# Patient Record
Sex: FEMALE | Employment: OTHER | URBAN - METROPOLITAN AREA
[De-identification: names, ages, dates, MRNs, and addresses within clinical notes are randomized per-mention and may not be internally consistent; named-entity substitution may affect disease eponyms.]

---

## 2017-08-02 ENCOUNTER — APPOINTMENT (OUTPATIENT)
Dept: CT IMAGING | Age: 82
DRG: 689 | End: 2017-08-02
Attending: EMERGENCY MEDICINE
Payer: MEDICARE

## 2017-08-02 ENCOUNTER — APPOINTMENT (OUTPATIENT)
Dept: MRI IMAGING | Age: 82
DRG: 689 | End: 2017-08-02
Attending: PSYCHIATRY & NEUROLOGY
Payer: MEDICARE

## 2017-08-02 ENCOUNTER — APPOINTMENT (OUTPATIENT)
Dept: GENERAL RADIOLOGY | Age: 82
DRG: 689 | End: 2017-08-02
Attending: EMERGENCY MEDICINE
Payer: MEDICARE

## 2017-08-02 ENCOUNTER — HOSPITAL ENCOUNTER (INPATIENT)
Age: 82
LOS: 3 days | Discharge: NURSING, REHAB OR LONG-TERM CARE | DRG: 689 | End: 2017-08-05
Attending: EMERGENCY MEDICINE | Admitting: STUDENT IN AN ORGANIZED HEALTH CARE EDUCATION/TRAINING PROGRAM
Payer: MEDICARE

## 2017-08-02 DIAGNOSIS — N39.0 SEPSIS SECONDARY TO UTI (HCC): Primary | ICD-10-CM

## 2017-08-02 DIAGNOSIS — R56.9 SEIZURE (HCC): ICD-10-CM

## 2017-08-02 DIAGNOSIS — A41.9 SEPSIS SECONDARY TO UTI (HCC): Primary | ICD-10-CM

## 2017-08-02 DIAGNOSIS — G30.9 ALZHEIMER'S DEMENTIA WITH BEHAVIORAL DISTURBANCE, UNSPECIFIED TIMING OF DEMENTIA ONSET: ICD-10-CM

## 2017-08-02 DIAGNOSIS — F02.81 ALZHEIMER'S DEMENTIA WITH BEHAVIORAL DISTURBANCE, UNSPECIFIED TIMING OF DEMENTIA ONSET: ICD-10-CM

## 2017-08-02 PROBLEM — I10 HTN (HYPERTENSION): Chronic | Status: ACTIVE | Noted: 2017-08-02

## 2017-08-02 PROBLEM — F03.90 DEMENTIA (HCC): Chronic | Status: ACTIVE | Noted: 2017-08-02

## 2017-08-02 PROBLEM — E03.9 HYPOTHYROIDISM: Chronic | Status: ACTIVE | Noted: 2017-08-02

## 2017-08-02 PROBLEM — I73.9 PVD (PERIPHERAL VASCULAR DISEASE) (HCC): Chronic | Status: ACTIVE | Noted: 2017-08-02

## 2017-08-02 LAB
ALBUMIN SERPL BCP-MCNC: 3.2 G/DL (ref 3.5–5)
ALBUMIN/GLOB SERPL: 0.8 {RATIO} (ref 1.1–2.2)
ALP SERPL-CCNC: 56 U/L (ref 45–117)
ALT SERPL-CCNC: 14 U/L (ref 12–78)
ANION GAP BLD CALC-SCNC: 6 MMOL/L (ref 5–15)
APPEARANCE UR: ABNORMAL
AST SERPL W P-5'-P-CCNC: 48 U/L (ref 15–37)
ATRIAL RATE: 95 BPM
BACTERIA URNS QL MICRO: ABNORMAL /HPF
BASOPHILS # BLD AUTO: 0 K/UL (ref 0–0.1)
BASOPHILS # BLD: 0 % (ref 0–1)
BILIRUB SERPL-MCNC: 0.5 MG/DL (ref 0.2–1)
BILIRUB UR QL: NEGATIVE
BUN SERPL-MCNC: 21 MG/DL (ref 6–20)
BUN/CREAT SERPL: 17 (ref 12–20)
CALCIUM SERPL-MCNC: 9 MG/DL (ref 8.5–10.1)
CALCULATED P AXIS, ECG09: 70 DEGREES
CALCULATED R AXIS, ECG10: -32 DEGREES
CALCULATED T AXIS, ECG11: 26 DEGREES
CHLORIDE SERPL-SCNC: 101 MMOL/L (ref 97–108)
CO2 SERPL-SCNC: 31 MMOL/L (ref 21–32)
COLOR UR: ABNORMAL
CREAT SERPL-MCNC: 1.21 MG/DL (ref 0.55–1.02)
DIAGNOSIS, 93000: NORMAL
DIFFERENTIAL METHOD BLD: ABNORMAL
EOSINOPHIL # BLD: 0.2 K/UL (ref 0–0.4)
EOSINOPHIL NFR BLD: 2 % (ref 0–7)
EPITH CASTS URNS QL MICRO: ABNORMAL /LPF
ERYTHROCYTE [DISTWIDTH] IN BLOOD BY AUTOMATED COUNT: 15.6 % (ref 11.5–14.5)
GLOBULIN SER CALC-MCNC: 3.9 G/DL (ref 2–4)
GLUCOSE BLD STRIP.AUTO-MCNC: 101 MG/DL (ref 65–100)
GLUCOSE BLD STRIP.AUTO-MCNC: 110 MG/DL (ref 65–100)
GLUCOSE BLD STRIP.AUTO-MCNC: 91 MG/DL (ref 65–100)
GLUCOSE BLD STRIP.AUTO-MCNC: 99 MG/DL (ref 65–100)
GLUCOSE SERPL-MCNC: 111 MG/DL (ref 65–100)
GLUCOSE UR STRIP.AUTO-MCNC: NEGATIVE MG/DL
GRAN CASTS URNS QL MICRO: ABNORMAL /LPF
HCT VFR BLD AUTO: 36.5 % (ref 35–47)
HGB BLD-MCNC: 11.8 G/DL (ref 11.5–16)
HGB UR QL STRIP: ABNORMAL
KETONES UR QL STRIP.AUTO: NEGATIVE MG/DL
LACTATE SERPL-SCNC: 1 MMOL/L (ref 0.4–2)
LACTATE SERPL-SCNC: 5.5 MMOL/L (ref 0.4–2)
LEUKOCYTE ESTERASE UR QL STRIP.AUTO: ABNORMAL
LYMPHOCYTES # BLD AUTO: 35 % (ref 12–49)
LYMPHOCYTES # BLD: 3.4 K/UL (ref 0.8–3.5)
MCH RBC QN AUTO: 29.8 PG (ref 26–34)
MCHC RBC AUTO-ENTMCNC: 32.3 G/DL (ref 30–36.5)
MCV RBC AUTO: 92.2 FL (ref 80–99)
MONOCYTES # BLD: 0.8 K/UL (ref 0–1)
MONOCYTES NFR BLD AUTO: 8 % (ref 5–13)
NEUTS SEG # BLD: 5.4 K/UL (ref 1.8–8)
NEUTS SEG NFR BLD AUTO: 55 % (ref 32–75)
NITRITE UR QL STRIP.AUTO: NEGATIVE
P-R INTERVAL, ECG05: 194 MS
PH UR STRIP: 5.5 [PH] (ref 5–8)
PLATELET # BLD AUTO: 228 K/UL (ref 150–400)
POTASSIUM SERPL-SCNC: 5.2 MMOL/L (ref 3.5–5.1)
PROT SERPL-MCNC: 7.1 G/DL (ref 6.4–8.2)
PROT UR STRIP-MCNC: 30 MG/DL
Q-T INTERVAL, ECG07: 390 MS
QRS DURATION, ECG06: 84 MS
QTC CALCULATION (BEZET), ECG08: 490 MS
RBC # BLD AUTO: 3.96 M/UL (ref 3.8–5.2)
RBC #/AREA URNS HPF: ABNORMAL /HPF (ref 0–5)
RBC MORPH BLD: ABNORMAL
RBC MORPH BLD: ABNORMAL
SERVICE CMNT-IMP: ABNORMAL
SERVICE CMNT-IMP: ABNORMAL
SERVICE CMNT-IMP: NORMAL
SERVICE CMNT-IMP: NORMAL
SODIUM SERPL-SCNC: 138 MMOL/L (ref 136–145)
SP GR UR REFRACTOMETRY: 1.02 (ref 1–1.03)
TROPONIN I SERPL-MCNC: <0.04 NG/ML
UROBILINOGEN UR QL STRIP.AUTO: 0.2 EU/DL (ref 0.2–1)
VENTRICULAR RATE, ECG03: 95 BPM
WBC # BLD AUTO: 9.8 K/UL (ref 3.6–11)
WBC CASTS URNS QL MICRO: ABNORMAL /LPF
WBC URNS QL MICRO: >100 /HPF (ref 0–4)

## 2017-08-02 PROCEDURE — 84484 ASSAY OF TROPONIN QUANT: CPT | Performed by: EMERGENCY MEDICINE

## 2017-08-02 PROCEDURE — 71010 XR CHEST PORT: CPT

## 2017-08-02 PROCEDURE — 87086 URINE CULTURE/COLONY COUNT: CPT | Performed by: EMERGENCY MEDICINE

## 2017-08-02 PROCEDURE — 80053 COMPREHEN METABOLIC PANEL: CPT | Performed by: EMERGENCY MEDICINE

## 2017-08-02 PROCEDURE — 96365 THER/PROPH/DIAG IV INF INIT: CPT

## 2017-08-02 PROCEDURE — 74011000258 HC RX REV CODE- 258: Performed by: EMERGENCY MEDICINE

## 2017-08-02 PROCEDURE — 87040 BLOOD CULTURE FOR BACTERIA: CPT | Performed by: EMERGENCY MEDICINE

## 2017-08-02 PROCEDURE — 70551 MRI BRAIN STEM W/O DYE: CPT

## 2017-08-02 PROCEDURE — 87186 SC STD MICRODIL/AGAR DIL: CPT | Performed by: EMERGENCY MEDICINE

## 2017-08-02 PROCEDURE — 96375 TX/PRO/DX INJ NEW DRUG ADDON: CPT

## 2017-08-02 PROCEDURE — 36415 COLL VENOUS BLD VENIPUNCTURE: CPT | Performed by: EMERGENCY MEDICINE

## 2017-08-02 PROCEDURE — 65660000000 HC RM CCU STEPDOWN

## 2017-08-02 PROCEDURE — 83605 ASSAY OF LACTIC ACID: CPT | Performed by: EMERGENCY MEDICINE

## 2017-08-02 PROCEDURE — 74011250637 HC RX REV CODE- 250/637: Performed by: STUDENT IN AN ORGANIZED HEALTH CARE EDUCATION/TRAINING PROGRAM

## 2017-08-02 PROCEDURE — 74011250636 HC RX REV CODE- 250/636: Performed by: STUDENT IN AN ORGANIZED HEALTH CARE EDUCATION/TRAINING PROGRAM

## 2017-08-02 PROCEDURE — 99285 EMERGENCY DEPT VISIT HI MDM: CPT

## 2017-08-02 PROCEDURE — 74011250636 HC RX REV CODE- 250/636: Performed by: PSYCHIATRY & NEUROLOGY

## 2017-08-02 PROCEDURE — 85025 COMPLETE CBC W/AUTO DIFF WBC: CPT | Performed by: EMERGENCY MEDICINE

## 2017-08-02 PROCEDURE — 83605 ASSAY OF LACTIC ACID: CPT | Performed by: STUDENT IN AN ORGANIZED HEALTH CARE EDUCATION/TRAINING PROGRAM

## 2017-08-02 PROCEDURE — 93005 ELECTROCARDIOGRAM TRACING: CPT

## 2017-08-02 PROCEDURE — 74011250636 HC RX REV CODE- 250/636

## 2017-08-02 PROCEDURE — 87077 CULTURE AEROBIC IDENTIFY: CPT | Performed by: EMERGENCY MEDICINE

## 2017-08-02 PROCEDURE — 82962 GLUCOSE BLOOD TEST: CPT

## 2017-08-02 PROCEDURE — 81001 URINALYSIS AUTO W/SCOPE: CPT | Performed by: EMERGENCY MEDICINE

## 2017-08-02 PROCEDURE — 77030005514 HC CATH URETH FOL14 BARD -A

## 2017-08-02 PROCEDURE — 51702 INSERT TEMP BLADDER CATH: CPT

## 2017-08-02 PROCEDURE — 74011250636 HC RX REV CODE- 250/636: Performed by: EMERGENCY MEDICINE

## 2017-08-02 PROCEDURE — 95816 EEG AWAKE AND DROWSY: CPT | Performed by: STUDENT IN AN ORGANIZED HEALTH CARE EDUCATION/TRAINING PROGRAM

## 2017-08-02 PROCEDURE — 70450 CT HEAD/BRAIN W/O DYE: CPT

## 2017-08-02 RX ORDER — LORAZEPAM 2 MG/ML
INJECTION INTRAMUSCULAR
Status: COMPLETED
Start: 2017-08-02 | End: 2017-08-02

## 2017-08-02 RX ORDER — BUMETANIDE 1 MG/1
2 TABLET ORAL DAILY
Status: CANCELLED | OUTPATIENT
Start: 2017-08-02

## 2017-08-02 RX ORDER — HYDROCORTISONE 25 MG/G
1 CREAM TOPICAL
COMMUNITY
End: 2018-07-26

## 2017-08-02 RX ORDER — SERTRALINE HYDROCHLORIDE 50 MG/1
25 TABLET, FILM COATED ORAL DAILY
Status: DISCONTINUED | OUTPATIENT
Start: 2017-08-02 | End: 2017-08-05 | Stop reason: HOSPADM

## 2017-08-02 RX ORDER — GLIMEPIRIDE 2 MG/1
2 TABLET ORAL DAILY
COMMUNITY
End: 2018-07-30

## 2017-08-02 RX ORDER — DOXAZOSIN 2 MG/1
1 TABLET ORAL DAILY
Status: DISCONTINUED | OUTPATIENT
Start: 2017-08-02 | End: 2017-08-02

## 2017-08-02 RX ORDER — ACETAMINOPHEN 325 MG/1
650 TABLET ORAL
Status: DISCONTINUED | OUTPATIENT
Start: 2017-08-02 | End: 2017-08-05 | Stop reason: HOSPADM

## 2017-08-02 RX ORDER — AMLODIPINE BESYLATE 5 MG/1
5 TABLET ORAL DAILY
COMMUNITY
End: 2018-07-30

## 2017-08-02 RX ORDER — PANTOPRAZOLE SODIUM 40 MG/1
40 TABLET, DELAYED RELEASE ORAL DAILY
COMMUNITY

## 2017-08-02 RX ORDER — SODIUM CHLORIDE 0.9 % (FLUSH) 0.9 %
5-10 SYRINGE (ML) INJECTION AS NEEDED
Status: DISCONTINUED | OUTPATIENT
Start: 2017-08-02 | End: 2017-08-05 | Stop reason: HOSPADM

## 2017-08-02 RX ORDER — LORAZEPAM 2 MG/ML
1 INJECTION INTRAMUSCULAR AS NEEDED
Status: DISCONTINUED | OUTPATIENT
Start: 2017-08-02 | End: 2017-08-05 | Stop reason: HOSPADM

## 2017-08-02 RX ORDER — SERTRALINE HYDROCHLORIDE 25 MG/1
25 TABLET, FILM COATED ORAL DAILY
COMMUNITY

## 2017-08-02 RX ORDER — CALCIUM CARBONATE/VITAMIN D3 500 MG-10
1 TABLET ORAL DAILY
COMMUNITY
End: 2018-07-26

## 2017-08-02 RX ORDER — INSULIN LISPRO 100 [IU]/ML
INJECTION, SOLUTION INTRAVENOUS; SUBCUTANEOUS
Status: DISCONTINUED | OUTPATIENT
Start: 2017-08-02 | End: 2017-08-05 | Stop reason: HOSPADM

## 2017-08-02 RX ORDER — KETOCONAZOLE 20 MG/G
1 CREAM TOPICAL
COMMUNITY
End: 2018-07-30

## 2017-08-02 RX ORDER — BUMETANIDE 2 MG/1
2 TABLET ORAL DAILY
Status: ON HOLD | COMMUNITY
End: 2017-08-05

## 2017-08-02 RX ORDER — LANOLIN ALCOHOL/MO/W.PET/CERES
325 CREAM (GRAM) TOPICAL DAILY
Status: DISCONTINUED | OUTPATIENT
Start: 2017-08-02 | End: 2017-08-05 | Stop reason: HOSPADM

## 2017-08-02 RX ORDER — DOXAZOSIN 1 MG/1
1 TABLET ORAL DAILY
COMMUNITY
End: 2018-07-30

## 2017-08-02 RX ORDER — ENOXAPARIN SODIUM 100 MG/ML
40 INJECTION SUBCUTANEOUS EVERY 24 HOURS
Status: DISCONTINUED | OUTPATIENT
Start: 2017-08-02 | End: 2017-08-02 | Stop reason: DRUGHIGH

## 2017-08-02 RX ORDER — ACETAMINOPHEN 500 MG
1000 TABLET ORAL
COMMUNITY

## 2017-08-02 RX ORDER — FERROUS SULFATE, DRIED 160(50) MG
1 TABLET, EXTENDED RELEASE ORAL DAILY
Status: DISCONTINUED | OUTPATIENT
Start: 2017-08-02 | End: 2017-08-05 | Stop reason: HOSPADM

## 2017-08-02 RX ORDER — DOXAZOSIN 1 MG/1
1 TABLET ORAL DAILY
Status: DISCONTINUED | OUTPATIENT
Start: 2017-08-03 | End: 2017-08-05 | Stop reason: HOSPADM

## 2017-08-02 RX ORDER — DEXTROSE 50 % IN WATER (D50W) INTRAVENOUS SYRINGE
12.5-25 AS NEEDED
Status: DISCONTINUED | OUTPATIENT
Start: 2017-08-02 | End: 2017-08-02 | Stop reason: CLARIF

## 2017-08-02 RX ORDER — MAGNESIUM SULFATE 100 %
4 CRYSTALS MISCELLANEOUS AS NEEDED
Status: DISCONTINUED | OUTPATIENT
Start: 2017-08-02 | End: 2017-08-05 | Stop reason: HOSPADM

## 2017-08-02 RX ORDER — LORAZEPAM 2 MG/ML
1 INJECTION INTRAMUSCULAR
Status: COMPLETED | OUTPATIENT
Start: 2017-08-02 | End: 2017-08-02

## 2017-08-02 RX ORDER — MEMANTINE HYDROCHLORIDE 5 MG/1
10 TABLET ORAL 2 TIMES DAILY
Status: DISCONTINUED | OUTPATIENT
Start: 2017-08-02 | End: 2017-08-05 | Stop reason: HOSPADM

## 2017-08-02 RX ORDER — SODIUM CHLORIDE 9 MG/ML
50 INJECTION, SOLUTION INTRAVENOUS CONTINUOUS
Status: DISCONTINUED | OUTPATIENT
Start: 2017-08-02 | End: 2017-08-05

## 2017-08-02 RX ORDER — ENOXAPARIN SODIUM 100 MG/ML
30 INJECTION SUBCUTANEOUS EVERY 24 HOURS
Status: DISCONTINUED | OUTPATIENT
Start: 2017-08-02 | End: 2017-08-05 | Stop reason: HOSPADM

## 2017-08-02 RX ORDER — LEVOTHYROXINE SODIUM 50 UG/1
50 TABLET ORAL
Status: DISCONTINUED | OUTPATIENT
Start: 2017-08-03 | End: 2017-08-05 | Stop reason: HOSPADM

## 2017-08-02 RX ORDER — POTASSIUM CHLORIDE 600 MG/1
8 TABLET, FILM COATED, EXTENDED RELEASE ORAL DAILY
COMMUNITY
End: 2018-07-30

## 2017-08-02 RX ORDER — PANTOPRAZOLE SODIUM 40 MG/1
40 TABLET, DELAYED RELEASE ORAL
Status: DISCONTINUED | OUTPATIENT
Start: 2017-08-03 | End: 2017-08-05 | Stop reason: HOSPADM

## 2017-08-02 RX ORDER — LEVOTHYROXINE SODIUM 50 UG/1
50 TABLET ORAL
COMMUNITY

## 2017-08-02 RX ORDER — DONEPEZIL HYDROCHLORIDE 10 MG/1
10 TABLET, FILM COATED ORAL
Status: DISCONTINUED | OUTPATIENT
Start: 2017-08-02 | End: 2017-08-05 | Stop reason: HOSPADM

## 2017-08-02 RX ORDER — ALENDRONATE SODIUM 70 MG/1
70 TABLET ORAL
COMMUNITY
End: 2018-07-26

## 2017-08-02 RX ORDER — SODIUM CHLORIDE 0.9 % (FLUSH) 0.9 %
5-10 SYRINGE (ML) INJECTION EVERY 8 HOURS
Status: DISCONTINUED | OUTPATIENT
Start: 2017-08-02 | End: 2017-08-05 | Stop reason: HOSPADM

## 2017-08-02 RX ORDER — AMLODIPINE BESYLATE 5 MG/1
5 TABLET ORAL DAILY
Status: DISCONTINUED | OUTPATIENT
Start: 2017-08-02 | End: 2017-08-05 | Stop reason: HOSPADM

## 2017-08-02 RX ADMIN — Medication 10 ML: at 13:22

## 2017-08-02 RX ADMIN — SERTRALINE HYDROCHLORIDE 25 MG: 50 TABLET ORAL at 14:18

## 2017-08-02 RX ADMIN — SODIUM CHLORIDE 75 ML/HR: 900 INJECTION, SOLUTION INTRAVENOUS at 13:19

## 2017-08-02 RX ADMIN — Medication 10 ML: at 21:30

## 2017-08-02 RX ADMIN — DONEPEZIL HYDROCHLORIDE 10 MG: 10 TABLET, FILM COATED ORAL at 21:24

## 2017-08-02 RX ADMIN — ENOXAPARIN SODIUM 30 MG: 30 INJECTION SUBCUTANEOUS at 14:19

## 2017-08-02 RX ADMIN — MEMANTINE 10 MG: 5 TABLET ORAL at 21:24

## 2017-08-02 RX ADMIN — LORAZEPAM 1 MG: 2 INJECTION INTRAMUSCULAR at 09:27

## 2017-08-02 RX ADMIN — SODIUM CHLORIDE 1000 ML: 900 INJECTION, SOLUTION INTRAVENOUS at 10:29

## 2017-08-02 RX ADMIN — CALCIUM CARBONATE-VITAMIN D TAB 500 MG-200 UNIT 1 TABLET: 500-200 TAB at 14:18

## 2017-08-02 RX ADMIN — LORAZEPAM 1 MG: 2 INJECTION INTRAMUSCULAR; INTRAVENOUS at 09:27

## 2017-08-02 RX ADMIN — FERROUS SULFATE TAB 325 MG (65 MG ELEMENTAL FE) 325 MG: 325 (65 FE) TAB at 14:18

## 2017-08-02 RX ADMIN — LORAZEPAM 1 MG: 2 INJECTION INTRAMUSCULAR; INTRAVENOUS at 21:24

## 2017-08-02 RX ADMIN — SODIUM CHLORIDE 1500 ML: 900 INJECTION, SOLUTION INTRAVENOUS at 10:50

## 2017-08-02 RX ADMIN — AMLODIPINE BESYLATE 5 MG: 5 TABLET ORAL at 14:20

## 2017-08-02 RX ADMIN — CEFTRIAXONE 1 G: 1 INJECTION, POWDER, FOR SOLUTION INTRAMUSCULAR; INTRAVENOUS at 10:29

## 2017-08-02 NOTE — PROGRESS NOTES
Spiritual Care Assessment/Progress Notes    [facility] [id number]  [id number]    9/26/1920  80 y.o.  female    Patient Telephone Number: [phone] (home)   Religion Affiliation: No Shinto   Language: English   Extended Emergency Contact Information  Primary Emergency Contact: [address] Phone: [phone]  Relation: Daughter  Secondary Emergency Contact: [address] Phone: [phone]  Relation: Son   Patient Active Problem List    Diagnosis Date Noted    Seizure [facility]) 08/02/2017    UTI (urinary tract infection) 08/02/2017    Dementia 08/02/2017    Hypothyroidism 08/02/2017    HTN (hypertension) 08/02/2017    PVD (peripheral vascular disease) ([facility] [address] 08/02/2017        Date: 8/2/2017       Level of Religion/Spiritual Activity:  [x]         Involved in alice tradition/spiritual practice    []         Not involved in alice tradition/spiritual practice  []         Spiritually oriented    []         Claims no spiritual orientation    []         seeking spiritual identity  []         Feels alienated from Mandaen practice/tradition  []         Feels angry about Mandaen practice/tradition  [x]         Spirituality/Mandaen tradition is a resource for coping at this time.   []         Not able to assess due to medical condition    Services Provided Today:  []         crisis intervention    []         reading Scriptures  [x]         spiritual assessment    []         prayer  [x]         empathic listening/emotional support  []         rites and rituals (cite in comments)  []         life review     []         Mandaen support  []         theological development   []         advocacy  []         ethical dialog     []         blessing  []         bereavement support    [x]         support to family  []         anticipatory grief support   []         help with AMD  []         spiritual guidance    []         meditation      Spiritual Care Needs  [x]         Emotional Support  [x]         Spiritual/Latter day Care  []         Loss/Adjustment  []         Advocacy/Referral                /Ethics  []         No needs expressed at               this time  []         Other: (note in               comments)  Spiritual Care Plan  []         Follow up visits with               pt/family  []         Provide materials  []         Schedule sacraments  []         Contact Community               Clergy  [x]         Follow up as needed  []         Other: (note in               comments)     On elevator to ICU encountered grandson who shared that his 79 yo grandmother had just been admitted for treatment. Assisted in accompanying him to her room and met Ms. Vazquez Rueda and her daughter Espinoza Finn. Ms. Mari Ramsey resides at [facility] of the [facility]. Is traditional Sabianism. Family had provided a rosary for her to hold. Provided an introduction to Ms. Vazquez Rueda and shared a brief prayer---which appeared to have a calming affect. Assured her and family of  support as needed and volunteered to contact Rachelle Green to provide an anointing. [facility]'s Staff  (Mk 5 Patient Care Specialist)   Paging Service [phone])

## 2017-08-02 NOTE — ROUTINE PROCESS
Primary Nurse Cesar Duvall, JACLYN and Kathy Rose, RN performed a dual skin assessment on this patient No pressure ulcers noted  Eleuterio score is 14

## 2017-08-02 NOTE — PROCEDURES
Name: Jeanne Singh    : 1920    DATE: 17    ELECTROENCEPHALOGRAM REPORT    HISTORY: The patient is a 80-year-old female who is being evaluated for  altered mental status and seizure activity. DESCRIPTION OF RECORDING: This is an 18-channel EEG performed on a poorly  responsive patient. There is no clear dominant background rhythm. Diffuse slowing is noted throughout the recording. Photic stimulation did  not elicit a driving response. Hyperventilation was not performed. EEG SUMMARY: Abnormal EEG due to moderate diffuse slowing. CLINICAL INTERPRETATION: This EEG is suggestive of moderate generalized  encephalopathic process, nonspecific in type. This may be related to an  underlying structural brain injury and/or toxic/metabolic abnormality. No  clearly lateralizing or epileptiform features were seen. No seizures  recorded. Please correlate clinically.     Verónica Madrid DO  17

## 2017-08-02 NOTE — CONSULTS
NEUROLOGY CONSULT NOTE    Name Hannah Boucher Age 80 y.o. MRN [id number]  1920     Consulting Physician: Ofelia Guzman MD      Chief Complaint: Seizure activity     Assessment:     · Principal Problem:  ·   Seizure ([facility] [address] (2017)  ·   · Active Problems:  ·   UTI (urinary tract infection) (2017)  ·   ·   Dementia (2017)  ·   ·   Hypothyroidism (2017)  ·   ·   HTN (hypertension) (2017)  ·   ·   PVD (peripheral vascular disease) ([facility] [address] (2017)  ·   ·   80year old female with a h/o dementia with baseline aphasia, non-ambulatory and wheelchair dependent, HTN, PVD, CKD, MDD, DM, hypothyroidism presenting with episodic generalized myoclonic activity per report with evidence of UTI on admission. She is presently alert, interactive but confused (baseline) with non-focal examination. Anticipate resolution with treatment of infectious processes. Recommendations:   MRI Brain WO  Routine EEG  Ativan 2mg IV PRN for seizure duration greater than or equal to 3 minutes or seizure frequency of 3 or more seizures per 8 hours  Seizure precautions    Thank you very much for this referral. I appreciate the opportunity to participate in this patient's care. History of Present Illness: This is a 80 y.o.   female, we were asked to see for possible seizure activity. PMH notable for dementia with baseline aphasia, non-ambulatory and wheelchair dependent, HTN, PVD, CKD, MDD, DM, hypothyroidism. She presents via EMS from [facility] the [facility] with jerking of the extremities. Episodes lasted just a few seconds involving both sides of the body. In the ED on arrival per EMR, pt was noted with generalized myoclonic jerks lasting 20 seconds followed by a second event minutes later with spontaneous termination. She received Ativan prior to transfer to ICU. Head CT performed and without acute ischemia or hemorrhage, although noted atrophy.   Admission labs positive for UTI, hyperkalemia. Her children report she has had episodic myoclonus in the past associated with UTI over 1 year ago. Otherwise, no h/o seizure activity. No Known Allergies     Prior to Admission medications    Medication Sig Start Date End Date Taking? Authorizing Provider   acetaminophen (TYLENOL) 500 mg tablet Take 1,000 mg by mouth two (2) times daily as needed for Pain or Fever. Yes Historical Provider   alendronate (FOSAMAX) 70 mg tablet Take 70 mg by mouth every seven (7) days. Tuesdays   Yes Historical Provider   amLODIPine (NORVASC) 5 mg tablet Take 5 mg by mouth daily. Yes Historical Provider   bumetanide (BUMEX) 2 mg tablet Take 2 mg by mouth daily. Yes Historical Provider   Calcium-Cholecalciferol, D3, 500 mg(1,250mg) -400 unit tab Take 1 Each by mouth daily. Yes Historical Provider   multivitamin, tx-iron-ca-min (THERA-M W/ IRON) 9 mg iron-400 mcg tab tablet Take 1 Tab by mouth daily. Yes Historical Provider   doxazosin (CARDURA) 1 mg tablet Take 1 mg by mouth daily. Yes Historical Provider   glimepiride (AMARYL) 2 mg tablet Take 2 mg by mouth daily. Yes Historical Provider   ferrous sulfate (SLOW FE) 142 mg (45 mg iron) ER tablet Take 142 mg by mouth daily. Yes Historical Provider   ketoconazole (NIZORAL) 2 % topical cream Apply 1 Each to affected area two (2) times daily as needed. for flaky areas   Yes Historical Provider   potassium chloride SR (KLOR-CON 8) 8 mEq tablet Take 8 mEq by mouth daily. Yes Historical Provider   levothyroxine (SYNTHROID) 50 mcg tablet Take 50 mcg by mouth Daily (before breakfast). Yes Historical Provider   memantine-donepezil 28-10 mg CSpX Take 1 Cap by mouth nightly. Yes Historical Provider   pantoprazole (PROTONIX) 40 mg tablet Take 40 mg by mouth daily. Yes Historical Provider   hydrocortisone (ANUSOL-HC) 2.5 % rectal cream Insert 1 Each into rectum nightly.  After bowel movement   Yes Historical Provider   sertraline (ZOLOFT) 25 mg tablet Take 25 mg by mouth daily. Yes Historical Provider       Past Medical History:   Diagnosis Date    Age related osteoporosis     Anemia     CKD (chronic kidney disease)     Conduct disorder     Delusional disorder (HCC)     Dementia     Dizziness and giddiness     [facility]     Essential hypertension     GERD (gastroesophageal reflux disease)     Hypothyroid     Major depressive disorder     Peripheral vascular disease ([facility]     Restlessness and agitation     Urge incontinence     Vertigo         No past surgical history on file. Social History   Substance Use Topics    Smoking status: Not on file    Smokeless tobacco: Not on file    Alcohol use Not on file        FHx- Reviewed and non-contributory    Review of Systems:   Not able to assess     Exam:     Visit Vitals    /57    Pulse 73    Temp 98 °F (36.7 °C)    Resp 21    Ht 5' (1.524 m)    Wt 67.4 kg (148 lb 9.4 oz)    SpO2 95%    BMI 29.02 kg/m2        General: Well developed, well nourished. Patient in no apparent distress   Head: Normocephalic, atraumatic, anicteric sclera   Lungs:  Clear to auscultation bilaterally, No wheezes or rubs   Cardiac: Regular rate and rhythm with no murmurs. Abd: Bowel sounds were audible. No tenderness on palpation   Ext: No pedal edema   Skin: No overt signs of rash     Neurological Exam:  Mental Status: Alert, follows commands, not oriented, +difficulty naming, no dysarthria       Cranial Nerves:   Intact visual fields. Facial sensation is normal. Facial movement is symmetric. Palate is midline. Normal sternocleidomastoid strength. Tongue is midline. Hearing is intact bilaterally. Eyes: PERRL, EOM's full, no nystagmus, no ptosis. Motor:  5/5 UE (maintains clenched fists which daughter states is behavioral), 4-/5 b/l LE. Reflexes:   Deep tendon reflexes 1+/4 and symmetrical.  Plantar response is downgoing b/l.     Sensory:   Symmetrically intact  with no perceived deficits modalities involving small or large fibers. Gait:  Gait is deferred. Tremor:   Intermittent tremulousness/restlessness noted with overt myoclonus or seizure like activity   Cerebellar:  No ataxia   Neurovascular: No carotid bruits. No JVD       Imaging  CT Results (maximum last 3): Results from [facility] encounter on 08/02/17   CT HEAD WO CONT   Narrative EXAM:  CT HEAD WO CONT    INDICATION:   new onset seizures, falls    COMPARISON: None. CONTRAST:  None. TECHNIQUE: Unenhanced CT of the head was performed using 5 mm images. Brain and  bone windows were generated. CT dose reduction was achieved through use of a  standardized protocol tailored for this examination and automatic exposure  control for dose modulation. FINDINGS:  Generalized atrophy is noted. Significant small vessel ischemic changes are seen  in the periventricular white matter. There is no intracranial hemorrhage,  extra-axial collection, mass, mass effect or midline shift. The basilar  cisterns are open. No acute infarct is identified. The bone windows demonstrate  no abnormalities. The visualized portions of the paranasal sinuses and mastoid  air cells are clear. Vascular calcification is noted. IMPRESSION: Atrophy. Significant small vessel ischemic changes. No acute  abnormality. MRI Results (maximum last 3): No results found for this or any previous visit.     Lab Review  Lab Results   Component Value Date/Time    WBC 9.8 08/02/2017 09:21 AM    HCT 36.5 08/02/2017 09:21 AM    HGB 11.8 08/02/2017 09:21 AM    PLATELET 776 05/25/1739 09:21 AM     Lab Results   Component Value Date/Time    Sodium 138 08/02/2017 09:21 AM    Potassium 5.2 08/02/2017 09:21 AM    Chloride 101 08/02/2017 09:21 AM    CO2 31 08/02/2017 09:21 AM    Glucose 111 08/02/2017 09:21 AM    BUN 21 08/02/2017 09:21 AM    Creatinine 1.21 08/02/2017 09:21 AM    Calcium 9.0 08/02/2017 09:21 AM     No components found for: TROPQUANT  No results found for: OUSMANE    Signed:  Germaine Conte.  Galdino Akers DO  8/2/2017  5:04 PM

## 2017-08-02 NOTE — ED NOTES
Pt displaying seizurelike activity, generalized body movement to left side lasting ~20 sec. Dr. Kevin Lopez at bedside.  Verbal order received

## 2017-08-02 NOTE — ROUTINE PROCESS
TRANSFER - OUT REPORT:    Verbal report given to Aquiles Ernandez RN(name) on Richmond Corado  being transferred to ICU 15(unit) for routine progression of care       Report consisted of patients Situation, Background, Assessment and   Recommendations(SBAR). Information from the following report(s) SBAR, ED Summary, MAR, Recent Results and Cardiac Rhythm NSR was reviewed with the receiving nurse. Lines:   Peripheral IV 08/02/17 Left Forearm (Active)   Site Assessment Clean, dry, & intact 8/2/2017  9:41 AM   Phlebitis Assessment 0 8/2/2017  9:41 AM   Infiltration Assessment 0 8/2/2017  9:41 AM   Dressing Status Clean, dry, & intact 8/2/2017  9:41 AM   Dressing Type Transparent 8/2/2017  9:41 AM   Hub Color/Line Status Pink;Flushed;Patent 8/2/2017  9:41 AM   Action Taken Blood drawn 8/2/2017  9:41 AM       Peripheral IV 08/02/17 Right Forearm (Active)   Site Assessment Clean, dry, & intact 8/2/2017 10:18 AM   Phlebitis Assessment 0 8/2/2017 10:18 AM   Infiltration Assessment 0 8/2/2017 10:18 AM   Dressing Status Clean, dry, & intact 8/2/2017 10:18 AM   Dressing Type Transparent 8/2/2017 10:18 AM   Hub Color/Line Status Pink;Flushed;Patent 8/2/2017 10:18 AM   Action Taken Blood drawn 8/2/2017 10:18 AM        Opportunity for questions and clarification was provided.       Patient transported with:   Monitor  O2 @ 2 liters  Registered Nurse

## 2017-08-02 NOTE — PROGRESS NOTES
1300-Pt admitted to ICU 15 from ED. Pt answers simple questions, but has confused speech. COPPOLA and follows simple commands. Chiquita hugger off at this time: temp 98.9    1403- Lactic acid level sent to lab. 1430- EEG performed at bedside. 1930- Bedside report given to Daniel Richmond RN, using SBAR format. Pt resting quietly with family at bedside. No seizures noted throughout shift.   Awaiting bed on NSTU

## 2017-08-02 NOTE — ED TRIAGE NOTES
Pt arrives via EMS from [facility] for reports of seizures. EMS witnessed tremors. Per daughter pt is non verbal at baseline. Daughter reports history of tremors in the past. Daughter states \"mom doesn't seem right\".

## 2017-08-02 NOTE — H&P
History & Physical    Date of admission: 8/2/2017    Patient name: Marguerite Momin  MRN: [id number]  YOB: 1920  Age: 80 y.o. Primary care provider:  Adam Castañeda MD     Source of Information: medical records                                 Chief complaint: altered mental status, twitching movements    History of present illness  Marguerite Momin is a 80 y.o. female with history of dementia, HTN, PVD, osteoporosis, DM-2 who presents with altered mental status and abnormal movements. At baseline, patient is reported to be verbal, but non-sensical in speech, ambulatory without swallow issues. She had received Ativan prior to my arrival and is unable to provide any meaningful information. History is obtained from chart review as her daughter was not present at bedside. Per reports, patient had generally been in her usual state of health until ~1-2 weeks ago when she had started having more frequent falls. She was found by her daughter this morning having twitching movements of her body with inattentiveness (at baseline she is able to look and focus), which is different than her baseline. This had prompted her daughter to bring her to the hospital for further evaluation. There had been a previous incident with altered mental status years ago where she had been diagnosed with urinary tract infection. She was noted to be borderline hypothermic with rectal temperature 95.7 F upon admission. She was noted to have seizure-like activity by ED physician and was given Ativan 1 mg x 1. Review of systems  Review of systems not obtained due to patient factors. The remainder of the review of systems was reviewed and is noncontributory. No past medical history on file. No past surgical history on file. Prior to Admission medications    Medication Sig Start Date End Date Taking?  Authorizing Provider acetaminophen (TYLENOL) 500 mg tablet Take 1,000 mg by mouth two (2) times daily as needed for Pain or Fever. Yes Historical Provider   alendronate (FOSAMAX) 70 mg tablet Take 70 mg by mouth every seven (7) days. Tuesdays   Yes Historical Provider   amLODIPine (NORVASC) 5 mg tablet Take 5 mg by mouth daily. Yes Historical Provider   bumetanide (BUMEX) 2 mg tablet Take 2 mg by mouth daily. Yes Historical Provider   Calcium-Cholecalciferol, D3, 500 mg(1,250mg) -400 unit tab Take 1 Each by mouth daily. Yes Historical Provider   multivitamin, tx-iron-ca-min (THERA-M W/ IRON) 9 mg iron-400 mcg tab tablet Take 1 Tab by mouth daily. Yes Historical Provider   doxazosin (CARDURA) 1 mg tablet Take 1 mg by mouth daily. Yes Historical Provider   glimepiride (AMARYL) 2 mg tablet Take 2 mg by mouth daily. Yes Historical Provider   ferrous sulfate (SLOW FE) 142 mg (45 mg iron) ER tablet Take 142 mg by mouth daily. Yes Historical Provider   ketoconazole (NIZORAL) 2 % topical cream Apply 1 Each to affected area two (2) times daily as needed. for flaky areas   Yes Historical Provider   potassium chloride SR (KLOR-CON 8) 8 mEq tablet Take 8 mEq by mouth daily. Yes Historical Provider   levothyroxine (SYNTHROID) 50 mcg tablet Take 50 mcg by mouth Daily (before breakfast). Yes Historical Provider   memantine-donepezil 28-10 mg CSpX Take 1 Cap by mouth nightly. Yes Historical Provider   pantoprazole (PROTONIX) 40 mg tablet Take 40 mg by mouth daily. Yes Historical Provider   hydrocortisone (ANUSOL-HC) 2.5 % rectal cream Insert 1 Each into rectum nightly. After bowel movement   Yes Historical Provider   sertraline (ZOLOFT) 25 mg tablet Take 25 mg by mouth daily. Yes Historical Provider     No Known Allergies   No family history on file. Family history reviewed and non-contributory.      Social history  Patient resides    Independently      With family care    x  Assisted living      SNF    Ambulates Independently      With cane     x  Assisted walker         Alcohol history   x  None     Social     Chronic   Smoking history  x  None     Former smoker     Current smoker     History   Smoking Status    Not on file   Smokeless Tobacco    Not on file       Code status    Full code   x  DNR/DNI        Code status discussed with the patient/caregivers. No Order        Physical Examination   Visit Vitals    BP (!) 99/38    Pulse 71    Temp 95.7 °F (35.4 °C)    Resp 11    Ht 5' (1.524 m)    Wt 65.8 kg (145 lb)    SpO2 100%    BMI 28.32 kg/m2          O2 Device: Room air    General:  Drowsy, lethargic, withdraws to painful stimuli in all extremities   Head:  Normocephalic, without obvious abnormality, atraumatic   Eyes:  Conjunctivae/corneas clear. Pupils are 3 mm bilaterally, but reactive to light. E/N/M/T: Nares normal. Septum midline. No nasal drainage or sinus tenderness  Lips, mucosa, and tongue normal   Teeth and gums normal  Clear oropharynx   Neck: Normal appearance and movements, symmetrical, trachea midline  No palpable adenopathy  No thyroid enlargement, tenderness or nodules  No carotid bruit   Normal JVP   Lungs:   Symmetrical chest expansion and respiratory effort  Clear to auscultation bilaterally   Chest wall:  No tenderness or deformity   Heart:  Regular rhythm   Sounds normal; no murmur, click, rub or gallop   Abdomen:   Soft, no tenderness  Bowel sounds normal  No masses or hepatosplenomegaly  No hernias present   Back: No CVA tenderness   Extremities: Extremities normal, atraumatic  No cyanosis or edema  No DVT signs   Pulses 2+ and symmetric all extremities   Skin: No rashes or ulcers   Musculo-      skeletal: Gait not tested  Normal symmetry, ROM, strength and tone   Neuro: Unable to assess secondary to lethargy, seemingly non-focal   Psych: Drowsy, lethargic, unable to fully assess   Genitourinary: Pope catheter in place.      Data Review    24 Hour Results:  Recent Results (from the past 24 hour(s))   GLUCOSE, POC    Collection Time: 08/02/17  9:17 AM   Result Value Ref Range    Glucose (POC) 110 (H) 65 - 100 mg/dL    Performed by Cl Bloom    CBC WITH AUTOMATED DIFF    Collection Time: 08/02/17  9:21 AM   Result Value Ref Range    WBC 9.8 3.6 - 11.0 K/uL    RBC 3.96 3.80 - 5.20 M/uL    HGB 11.8 11.5 - 16.0 g/dL    HCT 36.5 35.0 - 47.0 %    MCV 92.2 80.0 - 99.0 FL    MCH 29.8 26.0 - 34.0 PG    MCHC 32.3 30.0 - 36.5 g/dL    RDW 15.6 (H) 11.5 - 14.5 %    PLATELET 970 574 - 579 K/uL    NEUTROPHILS 55 32 - 75 %    LYMPHOCYTES 35 12 - 49 %    MONOCYTES 8 5 - 13 %    EOSINOPHILS 2 0 - 7 %    BASOPHILS 0 0 - 1 %    ABS. NEUTROPHILS 5.4 1.8 - 8.0 K/UL    ABS. LYMPHOCYTES 3.4 0.8 - 3.5 K/UL    ABS. MONOCYTES 0.8 0.0 - 1.0 K/UL    ABS. EOSINOPHILS 0.2 0.0 - 0.4 K/UL    ABS. BASOPHILS 0.0 0.0 - 0.1 K/UL    DF SMEAR SCANNED      RBC COMMENTS ANISOCYTOSIS  1+        RBC COMMENTS OVALOCYTES  PRESENT       METABOLIC PANEL, COMPREHENSIVE    Collection Time: 08/02/17  9:21 AM   Result Value Ref Range    Sodium 138 136 - 145 mmol/L    Potassium 5.2 (H) 3.5 - 5.1 mmol/L    Chloride 101 97 - 108 mmol/L    CO2 31 21 - 32 mmol/L    Anion gap 6 5 - 15 mmol/L    Glucose 111 (H) 65 - 100 mg/dL    BUN 21 (H) 6 - 20 MG/DL    Creatinine 1.21 (H) 0.55 - 1.02 MG/DL    BUN/Creatinine ratio 17 12 - 20      GFR est AA 50 (L) >60 ml/min/1.73m2    GFR est non-AA 41 (L) >60 ml/min/1.73m2    Calcium 9.0 8.5 - 10.1 MG/DL    Bilirubin, total 0.5 0.2 - 1.0 MG/DL    ALT (SGPT) 14 12 - 78 U/L    AST (SGOT) 48 (H) 15 - 37 U/L    Alk.  phosphatase 56 45 - 117 U/L    Protein, total 7.1 6.4 - 8.2 g/dL    Albumin 3.2 (L) 3.5 - 5.0 g/dL    Globulin 3.9 2.0 - 4.0 g/dL    A-G Ratio 0.8 (L) 1.1 - 2.2     TROPONIN I    Collection Time: 08/02/17  9:21 AM   Result Value Ref Range    Troponin-I, Qt. <0.04 <0.05 ng/mL   URINALYSIS W/ RFLX MICROSCOPIC    Collection Time: 08/02/17  9:21 AM   Result Value Ref Range    Color YELLOW/STRAW Appearance TURBID (A) CLEAR      Specific gravity 1.018 1.003 - 1.030      pH (UA) 5.5 5.0 - 8.0      Protein 30 (A) NEG mg/dL    Glucose NEGATIVE  NEG mg/dL    Ketone NEGATIVE  NEG mg/dL    Bilirubin NEGATIVE  NEG      Blood SMALL (A) NEG      Urobilinogen 0.2 0.2 - 1.0 EU/dL    Nitrites NEGATIVE  NEG      Leukocyte Esterase LARGE (A) NEG      WBC >100 (H) 0 - 4 /hpf    RBC 0-5 0 - 5 /hpf    Epithelial cells FEW FEW /lpf    Bacteria 4+ (A) NEG /hpf    Granular cast 0-2 (A) NEG /lpf    WBC Cast 2-5 (A) NEG /lpf   EKG, 12 LEAD, INITIAL    Collection Time: 08/02/17  9:47 AM   Result Value Ref Range    Ventricular Rate 95 BPM    Atrial Rate 95 BPM    P-R Interval 194 ms    QRS Duration 84 ms    Q-T Interval 390 ms    QTC Calculation (Bezet) 490 ms    Calculated P Axis 70 degrees    Calculated R Axis -32 degrees    Calculated T Axis 26 degrees    Diagnosis       Sinus rhythm with occasional premature ventricular complexes  Left axis deviation  When compared with ECG of 26-MAY-2010 17:16,  premature ventricular complexes are now present  T wave inversion no longer evident in Lateral leads       Recent Labs      08/02/17   0921   WBC  9.8   HGB  11.8   HCT  36.5   PLT  228     Recent Labs      08/02/17   0921   NA  138   K  5.2*   CL  101   CO2  31   GLU  111*   BUN  21*   CREA  1.21*   CA  9.0   ALB  3.2*   SGOT  48*   ALT  14       Imaging   CT head 8/2 - Atrophy. Significant small vessel ischemic changes. No acute abnormality. CXR 8/2 - No acute process. Assessment and Plan   Principal Problem:    Seizure ([facility] [address] (8/2/2017)    Active Problems:    UTI (urinary tract infection) (8/2/2017)      Dementia (8/2/2017)      Hypothyroidism (8/2/2017)      HTN (hypertension) (8/2/2017)      PVD (peripheral vascular disease) ([facility] [address] (8/2/2017)        Assess: 80year old female with altered mental status, reported convulsions, suspected UTI    1.   Altered mental status   - possibly post-ictal after seizures / convulsions as below or toxic encephalopathy secondary to UTI vs unknown cause   - treating as below    2. Convulsions, possible seizure   - witnessed seizure-like activity in ED, possibly brought on by underlying UTI   - CT head 8/2 - Atrophy. Significant small vessel ischemic changes. No acute abnormality. - check EEG   - seizure precautions   - IV fluids   - neurology consulted     3. Suspected UTI   - UA 8/2 - > 100 WBCs, 4+ bacteria   - urine culture 8/2 - pending   - blood cultures 8/2 - pending   - on empiric ceftriaxone   - continue IV fluids    4. Dementia   - seemingly at baseline prior to this   - continue home Namenda, Aricept   - supportive care    5. HTN   - resume home amlodipine, Cardura with holding parameters    6. PVD   - conservative management    7. Hypothyroidism   - check TSH   - resume home levothyroxine    8. DM-2   - sliding scale insulin coverage    9. Lactic acidosis   - suspect this is secondary to volume depletion, now resolved with IV fluids    Diet: diabetic  Activity: out of bed with assistance  DVT prophylaxis: Lovenox  Isolation precautions: none  Consultations: neurology  Anticipated disposition: requires inpatient care currently         Signed by: Vazquez Bose MD     August 2, 2017 at 10:59 AM     Time spent on admission > 70 minutes.

## 2017-08-02 NOTE — ED PROVIDER NOTES
HPI Comments: 80 y.o. female with past medical history significant for dementia, HTN, PVD, osteoporosis, who presents from [address] for evaluation of episodic twitching and altered mental status. Pt's daughter states that this morning, pt started having 1-2 second episodes of whole-body twitching/jerking where she would cry out at the same time. Daughter states that she has \"never seen anything like this\", and notes that pt is non-verbal and disoriented at baseline. However, she reports that patient's current condition is different than her baseline in that she has been in/out of responsiveness where she is awake and breathing, but not making eye contact like she normally does. Per daughter, the episodes of jerking have become more frequent since pt arrived to the ED. Daughter states that several years ago pt had a change in mental status and was dx with a UTI at that time. Daughter also mentions that patient has had several falls within the past 1-2 weeks, and has a bruise on the right elbow. However, she is unsure if pt hit her head during any of the falls. Daughter denies pt having any other skin breakdown, and denies known AC therapy. She specifically denies pt having any fevers, vomiting, or h/o CA. Of note, pt is a DNR. There are no other acute medical concerns at this time. Social hx: Is a resident of [address]   PCP: Tana Guthrie MD     Full history, physical exam, and ROS unable to be obtained due to: dementia. Note written by Beny Viramontes. Doug Hess, as dictated by Fidel Graff MD 9:30 AM     The history is provided by a relative (daughter). The history is limited by the condition of the patient. No  was used. No past medical history on file. No past surgical history on file. No family history on file.     Social History     Social History    Marital status:      Spouse name: N/A    Number of children: N/A    Years of education: N/A     Occupational History    Not on file. Social History Main Topics    Smoking status: Not on file    Smokeless tobacco: Not on file    Alcohol use Not on file    Drug use: Not on file    Sexual activity: Not on file     Other Topics Concern    Not on file     Social History Narrative         ALLERGIES: Review of patient's allergies indicates no known allergies. Review of Systems   Unable to perform ROS: Dementia       Vitals:    08/02/17 0930 08/02/17 0945 08/02/17 0948 08/02/17 1015   BP: (!) 210/189   130/50   Pulse: (!) 101   78   Resp: 22   20   Temp:  95.7 °F (35.4 °C)     SpO2: 99%   100%   Weight:   65.8 kg (145 lb)    Height:   5' (1.524 m)             Physical Exam   Constitutional: She appears well-developed and well-nourished. No distress. HENT:   Head: Normocephalic and atraumatic. Eyes:   Pupils are 4mm, round, sluggish, but reactive to light   Neck: Normal range of motion. Neck supple. Cardiovascular: Normal rate, regular rhythm and normal heart sounds. Exam reveals no gallop and no friction rub. No murmur heard. Pulmonary/Chest: Effort normal and breath sounds normal. No respiratory distress. She has no wheezes. Abdominal: Soft. Bowel sounds are normal. She exhibits no distension. There is no tenderness. There is no rebound and no guarding. Musculoskeletal: Normal range of motion. Neurological: She is alert. Patient initially awake and alert at baseline with myoclonic jerks (source unclear) that decayed into generalized myclonic seizure-like activity   Skin: Skin is warm. Ecchymosis (present on the right elbow) noted. No rash noted. She is not diaphoretic. Nursing note and vitals reviewed. Note written by Marciano Vega. Shelby Griggs, as dictated by Marguerite Ospina MD 9:30 AM       MDM  Number of Diagnoses or Management Options    ED Course   This is a 80 her old female past medical history significant for dementia, presenting with complaints of ulcer mental status, myoclonic jerks, who began seizing once here in the emergency department. Daughter states patient's mental status change for approximately one day, states she is usually awake, alert, not oriented, nonverbal, however not making eye contact as she usually does, associated with several myoclonic jerks per minute starting today. Exam remarkable for elderly individual, with generalized myoclonic jerks, without associated seizure-like activity, crit breath sounds, regular rate and rhythm without murmurs gallops or rubs, soft nontender abdomen. During exam, the patient began having myoclonic seizure, generalized, with apparent left-sided involvement more than right, followed by short postictal period, and second seizure. Both seizures terminated spontaneously, followed by dosing of benzodiazepines. Daughter states recent falls at her home, however patient not anticoagulated, daughter states previous similar episodes with very tract infection. Patient noted to be DNR. Patient noted to be hypothermic on rectal temperature, without other signs of sepsis. Plan to obtain CBC, CMP, blood cultures, lactate, head CT, chest x-ray, and make disposition based the patient's diagnostics a response therapy, however seems at this time the patient will likely require hospitalization for further evaluation and care. Procedures    PROGRESS NOTE:  9:29 AM   While in the exam room speaking with patient's daughter, patient had a 15-20 second episode of generalized with more left-sided seizure-like activity. Shortly afterwards, patient had a second episode which resolved after Ativan 1 mg was administered. Pt currently post-ictal, SpO2 98% on 2L O2 NC. Daughter at bedside. ED EKG interpretation:  0947  Rhythm: normal sinus rhythm and PVC's; and regular . Rate (approx.): 95 bpm; Axis: left axis deviation; ST/T wave: normal;    Note written by Lorena Selby. Chauncey Whitt, as dictated by Caity Arias Juanito Acharya MD 9:55 AM       PROGRESS NOTE:  10:32 AM   Family updated on all available results, and all questions have been answered. Head CT shows no acute abnormality, but will plan to admit pt to hospital and tx for urosepsis. CONSULT NOTE:  10:44 AM Jose Berry MD spoke with Dr. Kamron Aguilar, Consult for Hospitalist.  Discussed available diagnostic tests and clinical findings. He is in agreement with care plans as outlined.   Will evaluate the patient for admission to the hospital.

## 2017-08-02 NOTE — ED NOTES
Pt 20G IV in R forearm is patent and saline locked. Pt 20G IV in L forearm is patent and saline locked. Both sites are clean dry and intact. VSS. O2 sats 100% on 2L NC. No seizures noted since arrival. Pt transported to ICU 15 on monitor with RN.

## 2017-08-02 NOTE — ROUTINE PROCESS
TRANSFER - IN REPORT:    Verbal report received from Elidia, RN(name) on Richmond Corado  being received from ED(unit) for routine progression of care      Report consisted of patients Situation, Background, Assessment and   Recommendations(SBAR). Information from the following report(s) SBAR, Kardex, ED Summary, [facility] and Recent Results was reviewed with the receiving nurse. Opportunity for questions and clarification was provided. Assessment completed upon patients arrival to unit and care assumed.

## 2017-08-02 NOTE — PROGRESS NOTES
Admission Medication Reconciliation:    Information obtained from: Medication list provided by Alpa Her of the [facility]    Significant PMH/Disease States:   No past medical history on file. Chief Complaint for this Admission:  Seizure    Allergies:  Review of patient's allergies indicates no known allergies. Prior to Admission Medications:   Prior to Admission Medications   Prescriptions Last Dose Informant Patient Reported? Taking? Calcium-Cholecalciferol, D3, 500 mg(1,250mg) -400 unit tab 8/1/2017  Yes Yes   Sig: Take 1 Each by mouth daily. acetaminophen (TYLENOL) 500 mg tablet 8/2/2017 at am  Yes Yes   Sig: Take 1,000 mg by mouth two (2) times daily as needed for Pain or Fever. alendronate (FOSAMAX) 70 mg tablet 8/1/2017  Yes Yes   Sig: Take 70 mg by mouth every seven (7) days. Tuesdays   amLODIPine (NORVASC) 5 mg tablet 8/1/2017  Yes Yes   Sig: Take 5 mg by mouth daily. bumetanide (BUMEX) 2 mg tablet 8/1/2017  Yes Yes   Sig: Take 2 mg by mouth daily. doxazosin (CARDURA) 1 mg tablet 8/1/2017  Yes Yes   Sig: Take 1 mg by mouth daily. ferrous sulfate (SLOW FE) 142 mg (45 mg iron) ER tablet 8/1/2017  Yes Yes   Sig: Take 142 mg by mouth daily. glimepiride (AMARYL) 2 mg tablet 8/1/2017  Yes Yes   Sig: Take 2 mg by mouth daily. hydrocortisone (ANUSOL-HC) 2.5 % rectal cream 8/1/2017  Yes Yes   Sig: Insert 1 Each into rectum nightly. After bowel movement   ketoconazole (NIZORAL) 2 % topical cream 8/1/2017  Yes Yes   Sig: Apply 1 Each to affected area two (2) times daily as needed. for flaky areas   levothyroxine (SYNTHROID) 50 mcg tablet 8/2/2017 at am  Yes Yes   Sig: Take 50 mcg by mouth Daily (before breakfast). memantine-donepezil 28-10 mg CSpX 8/1/2017  Yes Yes   Sig: Take 1 Cap by mouth nightly. multivitamin, tx-iron-ca-min (THERA-M W/ IRON) 9 mg iron-400 mcg tab tablet 8/1/2017  Yes Yes   Sig: Take 1 Tab by mouth daily.    pantoprazole (PROTONIX) 40 mg tablet 8/2/2017 at am  Yes Yes Sig: Take 40 mg by mouth daily. potassium chloride SR (KLOR-CON 8) 8 mEq tablet 8/1/2017  Yes Yes   Sig: Take 8 mEq by mouth daily. sertraline (ZOLOFT) 25 mg tablet 8/1/2017  Yes Yes   Sig: Take 25 mg by mouth daily. Facility-Administered Medications: None         Comments/Recommendations:   Patient medication list and last administered dose gathered from list from [facility] [employer].     Added:  APAP  Alendronate  Amlodipine  Bumetanide  Calcium-Vit D  Centrum  Doxazosin  Glimepride  Iron  Ketoconazole  Klor-Con  Levothyroxine  Memenatine-Donepezil  Pantoprazole  Hydrocortisone  Zoloft    Dylon Bah)  PharmD Candidate [id number]

## 2017-08-02 NOTE — IP AVS SNAPSHOT
[address] 
[phone] Patient: Maryam Chin MRN: [id number]:[id number] You are allergic to the following No active allergies Recent Documentation Height Weight BMI Smoking Status 1.524 m 66.5 kg 28.63 kg/m2 Never Assessed Unresulted Labs Order Current Status SAMPLE TO BLOOD BANK In process CULTURE, BLOOD, PAIRED Preliminary result Emergency Contacts Name Discharge Info Relation Home Work Mobile [employer] 45 CAREGIVER [3] Daughter [21] [phone] Thanh Correa  Son [22] [phone] About your hospitalization You were admitted on:  August 2, 2017 You last received care in the:  [facility] 6S NEURO-SCI TELE You were discharged on:  August 5, 2017 Unit phone number:  [phone] Why you were hospitalized Your primary diagnosis was:  Seizure (Hcc) Your diagnoses also included:  Uti (Urinary Tract Infection), Dementia, Hypothyroidism, Htn (Hypertension), Pvd (Peripheral Vascular Disease) (Hcc) Providers Seen During Your Hospitalizations Provider Role Specialty Primary office phone Armaan Frances MD Attending Provider Emergency Medicine [phone] Héctor Smith MD Attending Provider Internal Medicine [phone] Seda Neff MD Attending Provider Internal Medicine [phone] Your Primary Care Physician (PCP) Primary Care Physician Office Phone Office Fax Sinan Jimenez [phone] Follow-up Information Follow up With Details Comments Contact Info Vik Townsend MD   [address] [place] [address] [address] [place] [address] 
[phone] Current Discharge Medication List  
  
START taking these medications Dose & Instructions Dispensing Information Comments Morning Noon Evening Bedtime  
 cefUROXime 125 mg/5 mL suspension Commonly known as:  CEFTIN Your last dose was: Your next dose is:    
   
   
 Dose:  250 mg Take 10 mL by mouth every twelve (12) hours for 7 days. Quantity:  280 mL Refills:  0  
     
   
   
   
  
 L. acidoph & paracasei- S therm- Bifido 8 billion cell Cap cap Commonly known as:  BRITTNY-Q/RISAQUAD Start taking on:  8/6/2017 Your last dose was: Your next dose is:    
   
   
 Dose:  1 Cap Take 1 Cap by mouth daily. Quantity:  10 Cap Refills:  0 CONTINUE these medications which have NOT CHANGED Dose & Instructions Dispensing Information Comments Morning Noon Evening Bedtime  
 acetaminophen 500 mg tablet Commonly known as:  TYLENOL Your last dose was: Your next dose is:    
   
   
 Dose:  1000 mg Take 1,000 mg by mouth two (2) times daily as needed for Pain or Fever. Refills:  0  
     
   
   
   
  
 alendronate 70 mg tablet Commonly known as:  FOSAMAX Your last dose was: Your next dose is:    
   
   
 Dose:  70 mg Take 70 mg by mouth every seven (7) days. Tuesdays Refills:  0  
     
   
   
   
  
 amLODIPine 5 mg tablet Commonly known as:  [place] Your last dose was: Your next dose is:    
   
   
 Dose:  5 mg Take 5 mg by mouth daily. Refills:  0  
     
   
   
   
  
 bumetanide 2 mg tablet Commonly known as:  Shahrzad Seals Your last dose was: Your next dose is:    
   
   
 Dose:  2 mg Take 1 Tab by mouth daily. Quantity:  30 Tab Refills:  0 Calcium-Cholecalciferol (D3) 500 mg(1,250mg) -400 unit Tab Your last dose was: Your next dose is:    
   
   
 Dose:  1 Each Take 1 Each by mouth daily. Refills:  0  
     
   
   
   
  
 doxazosin 1 mg tablet Commonly known as:  CARDURA Your last dose was: Your next dose is:    
   
   
 Dose:  1 mg Take 1 mg by mouth daily. Refills:  0 glimepiride 2 mg tablet Commonly known as:  AMARYL Your last dose was: Your next dose is:    
   
   
 Dose:  2 mg Take 2 mg by mouth daily. Refills:  0  
     
   
   
   
  
 hydrocortisone 2.5 % rectal cream  
Commonly known as:  ANUSOL-HC Your last dose was: Your next dose is:    
   
   
 Dose:  1 Each Insert 1 Each into rectum nightly. After bowel movement Refills:  0  
     
   
   
   
  
 ketoconazole 2 % topical cream  
Commonly known as:  NIZORAL Your last dose was: Your next dose is:    
   
   
 Dose:  1 Each Apply 1 Each to affected area two (2) times daily as needed. for flaky areas Refills:  0 KLOR-CON 8 8 mEq tablet Generic drug:  potassium chloride SR Your last dose was: Your next dose is:    
   
   
 Dose:  8 mEq Take 8 mEq by mouth daily. Refills:  0  
     
   
   
   
  
 levothyroxine 50 mcg tablet Commonly known as:  SYNTHROID Your last dose was: Your next dose is:    
   
   
 Dose:  50 mcg Take 50 mcg by mouth Daily (before breakfast). Refills:  0  
     
   
   
   
  
 memantine-donepezil 28-10 mg Cspx Your last dose was: Your next dose is:    
   
   
 Dose:  1 Cap Take 1 Cap by mouth nightly. Refills:  0  
     
   
   
   
  
 multivitamin, tx-iron-ca-min 9 mg iron-400 mcg Tab tablet Commonly known as:  THERA-M w/ IRON Your last dose was: Your next dose is:    
   
   
 Dose:  1 Tab Take 1 Tab by mouth daily. Refills:  0  
     
   
   
   
  
 pantoprazole 40 mg tablet Commonly known as:  PROTONIX Your last dose was: Your next dose is:    
   
   
 Dose:  40 mg Take 40 mg by mouth daily. Refills:  0  
     
   
   
   
  
 sertraline 25 mg tablet Commonly known as:  ZOLOFT Your last dose was: Your next dose is:    
   
   
 Dose:  25 mg Take 25 mg by mouth daily. Refills:  0 SLOW  mg (45 mg iron) ER tablet Generic drug:  ferrous sulfate Your last dose was: Your next dose is:    
   
   
 Dose:  142 mg Take 142 mg by mouth daily. Refills:  0 Where to Get Your Medications Information on where to get these meds will be given to you by the nurse or doctor. ! Ask your nurse or doctor about these medications  
  bumetanide 2 mg tablet  
 cefUROXime 125 mg/5 mL suspension L. acidoph & paracasei- S therm- Bifido 8 billion cell Cap cap Discharge Instructions Discharge SNF/Rehab Instructions/LTAC  
 
 
PATIENT ID: Marguerite Momin MRN: [id number] YOB: 1920 DATE OF ADMISSION: 8/2/2017  9:07 AM   
DATE OF DISCHARGE: 8/5/2017 PRIMARY CARE PROVIDER: Adam Castañeda MD  
 
 
ATTENDING PHYSICIAN: Chuyita Bautista MD 
DISCHARGING PROVIDER: Chuyita Bautista MD    
To contact this individual call [phone] and ask the  to page. If unavailable ask to be transferred the Adult Hospitalist Department. CONSULTATIONS: IP CONSULT TO HOSPITALIST 
IP CONSULT TO NEUROLOGY PROCEDURES/SURGERIES: * No surgery found * ADMITTING DIAGNOSES & HOSPITAL COURSE:  
Mortimer Meth Melito is a 80 y. o. female with history of dementia, HTN, PVD, osteoporosis, DM-2 who presents with altered mental status and abnormal movements.  At baseline, patient is reported to be verbal, but non-sensical in speech, ambulatory without swallow issues.  
Per reports, patient had generally been in her usual state of health until ~1-2 weeks ago when she had started having more frequent falls. Levora Salaam was found by her daughter having twitching movements of her body with inattentiveness (at baseline she is able to look and focus), which is different than her baseline.  This had prompted her daughter to bring her to the hospital for further evaluation. Sue Romero had been a previous incident with altered mental status years ago where she had been diagnosed with urinary tract infection.   
She was noted to be borderline hypothermic with rectal temperature 95.7 F upon admission.  She was noted to have seizure-like activity by ED physician and was given Ativan 1 mg x 1. 
   
Altered mental status likely due to metabolic encephalopathy related to UTI POA 
-patient is conscious and alert, follow simple command 
-CT head show atrophy, significant small vessel ischemic changes, no acute abnormality. -MRI brain no acute finding 
-urine cx positive for gram negative rods, blood cx no growth so far,  
-TSH normal 
-seen by neurologist 
Convulsion unclear etiology possible due to UTI 
-on prn ativan, no seizure since admission -EEG non specific 
-unlikely seizure disorder per neurologist 
-seen by neurologist   
UTI POA 
-on ceftriaxone and IVF, will switch to po cefuroxime 250 mg po q 12 on discharge 
-urine cx grow e coli sensitive to ceftriaxone 
-blood cx no growth so far  
Lactic acidosis multifactorial 
-resolved wit IVF  
HTN 
-BP normal, continue norvasc, cardura and monitor BP  
DM-II 
-continue sliding scale and monitor finger stick glucose  
-6. 2A1c 
-resume home glimepiride 2 mg po q daily, closely monitor finger stick glucose q AC, considering her age she is a high risk for hypoglycemia and may consider switch to only sliding scale Hypothyroidism  
-continue synthroid -TSH normal  
Dementia 
-continue namenda and aricept 
-non verbal at base line per report 
-continue supportive care 
  
  
Code status: DNR 
DVT prophylaxis: Lovenox DISCHARGE DIAGNOSES / PLAN:   
 
Altered mental status likely due to metabolic encephalopathy related to UTI POA 
-improved, appears at her base line Convulsion unclear etiology possible due to UTI 
-continue neuro check, avoid hypoglycemia and hydrate her UTI POA 
-cefuroxime 250 mg po q 12 for 7 days with floraQ Lactic acidosis multifactorial 
 -resolved HTN 
-continue norvasc, cardura and monitor BP  
DM-II 
-resume home glimepiride 2 mg po q daily,monitor finger stick glucose closely Hypothyroidism  
-continue synthroid  
Dementia 
-continue namenda and aricept 
-continue supportive care PENDING TEST RESULTS:  
At the time of discharge the following test results are still pending: none FOLLOW UP APPOINTMENTS:   
Follow-up Information Follow up With Details Comments Contact Info Azar Arreguin MD In one week  [address] [facility] [address] [place] [address] 
[phone] ADDITIONAL CARE RECOMMENDATIONS:  
 
DIET: Diabetic Diet TUBE FEEDING INSTRUCTIONS: none OXYGEN / BiPAP SETTINGS: none ACTIVITY: Activity as tolerated WOUND CARE: none EQUIPMENT needed: none DISCHARGE MEDICATIONS: 
 See Medication Reconciliation Form NOTIFY YOUR PHYSICIAN FOR ANY OF THE FOLLOWING:  
Fever over 101 degrees for 24 hours. Chest pain, shortness of breath, fever, chills, nausea, vomiting, diarrhea, change in mentation, falling, weakness, bleeding. Severe pain or pain not relieved by medications. Or, any other signs or symptoms that you may have questions about. DISPOSITION: 
  Home With: 
 OT  PT  HH  RN  
  
x SNF/Inpatient Rehab/LTAC Independent/assisted living Hospice Other:  
 
 
PATIENT CONDITION AT DISCHARGE:  
 
Functional status  
x Poor Deconditioned Independent Cognition Layton Sample Forgetful   
x Dementia Catheters/lines (plus indication) Pope PICC   
 PEG   
x None Code status Full code   
x DNR PHYSICAL EXAMINATION AT DISCHARGE: 
 Refer to Progress Note CHRONIC MEDICAL DIAGNOSES: 
Problem List as of 8/5/2017  Never Reviewed Codes Class Noted - Resolved * (Principal)Seizure ([facility] [address] ICD-10-CM: R56.9 ICD-9-CM: 780.39  8/2/2017 - Present UTI (urinary tract infection) ICD-10-CM: N39.0 ICD-9-CM: 599.0  8/2/2017 - Present Dementia (Chronic) ICD-10-CM: F03.90 ICD-9-CM: 294.20  8/2/2017 - Present Hypothyroidism (Chronic) ICD-10-CM: E03.9 ICD-9-CM: 244.9  8/2/2017 - Present HTN (hypertension) (Chronic) ICD-10-CM: I10 
ICD-9-CM: 401.9  8/2/2017 - Present PVD (peripheral vascular disease) (HCC) (Chronic) ICD-10-CM: I73.9 ICD-9-CM: 443.9  8/2/2017 - Present CDMP Checked:  
Yes x PROBLEM LIST Updated: 
Yes x Signed:  
Chuyita Bautista MD 
8/5/2017 
12:11 PM 
 
  
 
Discharge Orders None [employer] Announcement We are excited to announce that we are making your provider's discharge notes available to you in [employer]. You will see these notes when they are completed and signed by the physician that discharged you from your recent hospital stay. If you have any questions or concerns about any information you see in [employer], please call the Health Information Department where you were seen or reach out to your Primary Care Provider for more information about your plan of care. Introducing [facility] [place] [facility]! Opal Napier introduces [employer] patient portal. Now you can access parts of your medical record, email your doctor's office, and request medication refills online. 1. In your internet browser, go to [email or web address]¢ [email or web address] 2. Click on the First Time User? Click Here link in the Sign In box. You will see the New Member Sign Up page. 3. Enter your [employer] Access Code exactly as it appears below. You will not need to use this code after youve completed the sign-up process. If you do not sign up before the expiration date, you must request a new code. · [employer] Access Code: [email or web address] Expires: 10/31/2017  1:27 PM 
 
4. Enter the last four digits of your Social Security Number (xxxx) and Date of Birth (mm/dd/yyyy) as indicated and click Submit. You will be taken to the next sign-up page. 5. Create a [employer] ID. This will be your [employer] login ID and cannot be changed, so think of one that is secure and easy to remember. 6. Create a [employer] password. You can change your password at any time. 7. Enter your Password Reset Question and Answer. This can be used at a later time if you forget your password. 8. Enter your e-mail address. You will receive e-mail notification when new information is available in [address] 9. Click Sign Up. You can now view and download portions of your medical record. 10. Click the Download Summary menu link to download a portable copy of your medical information. If you have questions, please visit the Frequently Asked Questions section of the [employer] website. Remember, [employer] is NOT to be used for urgent needs. For medical emergencies, dial 911. Now available from your iPhone and Android! General Information Please provide this summary of care documentation to your next provider. Patient Signature:  ____________________________________________________________ Date:  ____________________________________________________________  
  
Laxmi Sloan Provider Signature:  ____________________________________________________________ Date:  ____________________________________________________________

## 2017-08-03 LAB
ANION GAP BLD CALC-SCNC: 4 MMOL/L (ref 5–15)
BASOPHILS # BLD AUTO: 0 K/UL (ref 0–0.1)
BASOPHILS # BLD: 0 % (ref 0–1)
BUN SERPL-MCNC: 12 MG/DL (ref 6–20)
BUN/CREAT SERPL: 14 (ref 12–20)
CALCIUM SERPL-MCNC: 7.8 MG/DL (ref 8.5–10.1)
CHLORIDE SERPL-SCNC: 108 MMOL/L (ref 97–108)
CO2 SERPL-SCNC: 30 MMOL/L (ref 21–32)
CREAT SERPL-MCNC: 0.88 MG/DL (ref 0.55–1.02)
EOSINOPHIL # BLD: 0.1 K/UL (ref 0–0.4)
EOSINOPHIL NFR BLD: 2 % (ref 0–7)
ERYTHROCYTE [DISTWIDTH] IN BLOOD BY AUTOMATED COUNT: 15.4 % (ref 11.5–14.5)
GLUCOSE BLD STRIP.AUTO-MCNC: 115 MG/DL (ref 65–100)
GLUCOSE BLD STRIP.AUTO-MCNC: 93 MG/DL (ref 65–100)
GLUCOSE BLD STRIP.AUTO-MCNC: 94 MG/DL (ref 65–100)
GLUCOSE BLD STRIP.AUTO-MCNC: 97 MG/DL (ref 65–100)
GLUCOSE SERPL-MCNC: 90 MG/DL (ref 65–100)
HCT VFR BLD AUTO: 29.2 % (ref 35–47)
HGB BLD-MCNC: 9.2 G/DL (ref 11.5–16)
LYMPHOCYTES # BLD AUTO: 37 % (ref 12–49)
LYMPHOCYTES # BLD: 2.4 K/UL (ref 0.8–3.5)
MAGNESIUM SERPL-MCNC: 2 MG/DL (ref 1.6–2.4)
MCH RBC QN AUTO: 29.5 PG (ref 26–34)
MCHC RBC AUTO-ENTMCNC: 31.5 G/DL (ref 30–36.5)
MCV RBC AUTO: 93.6 FL (ref 80–99)
MONOCYTES # BLD: 0.6 K/UL (ref 0–1)
MONOCYTES NFR BLD AUTO: 10 % (ref 5–13)
NEUTS SEG # BLD: 3.3 K/UL (ref 1.8–8)
NEUTS SEG NFR BLD AUTO: 51 % (ref 32–75)
PLATELET # BLD AUTO: 179 K/UL (ref 150–400)
POTASSIUM SERPL-SCNC: 3.5 MMOL/L (ref 3.5–5.1)
RBC # BLD AUTO: 3.12 M/UL (ref 3.8–5.2)
SERVICE CMNT-IMP: ABNORMAL
SERVICE CMNT-IMP: NORMAL
SODIUM SERPL-SCNC: 142 MMOL/L (ref 136–145)
TSH SERPL DL<=0.05 MIU/L-ACNC: 2.28 UIU/ML (ref 0.36–3.74)
WBC # BLD AUTO: 6.5 K/UL (ref 3.6–11)

## 2017-08-03 PROCEDURE — 85025 COMPLETE CBC W/AUTO DIFF WBC: CPT | Performed by: STUDENT IN AN ORGANIZED HEALTH CARE EDUCATION/TRAINING PROGRAM

## 2017-08-03 PROCEDURE — 74011000258 HC RX REV CODE- 258: Performed by: EMERGENCY MEDICINE

## 2017-08-03 PROCEDURE — 77010033678 HC OXYGEN DAILY

## 2017-08-03 PROCEDURE — 80048 BASIC METABOLIC PNL TOTAL CA: CPT | Performed by: STUDENT IN AN ORGANIZED HEALTH CARE EDUCATION/TRAINING PROGRAM

## 2017-08-03 PROCEDURE — 65660000000 HC RM CCU STEPDOWN

## 2017-08-03 PROCEDURE — 82962 GLUCOSE BLOOD TEST: CPT

## 2017-08-03 PROCEDURE — 84443 ASSAY THYROID STIM HORMONE: CPT | Performed by: STUDENT IN AN ORGANIZED HEALTH CARE EDUCATION/TRAINING PROGRAM

## 2017-08-03 PROCEDURE — 92610 EVALUATE SWALLOWING FUNCTION: CPT | Performed by: SPEECH-LANGUAGE PATHOLOGIST

## 2017-08-03 PROCEDURE — 36415 COLL VENOUS BLD VENIPUNCTURE: CPT | Performed by: STUDENT IN AN ORGANIZED HEALTH CARE EDUCATION/TRAINING PROGRAM

## 2017-08-03 PROCEDURE — 74011250636 HC RX REV CODE- 250/636: Performed by: STUDENT IN AN ORGANIZED HEALTH CARE EDUCATION/TRAINING PROGRAM

## 2017-08-03 PROCEDURE — 74011250637 HC RX REV CODE- 250/637: Performed by: STUDENT IN AN ORGANIZED HEALTH CARE EDUCATION/TRAINING PROGRAM

## 2017-08-03 PROCEDURE — 74011250636 HC RX REV CODE- 250/636: Performed by: EMERGENCY MEDICINE

## 2017-08-03 PROCEDURE — 83735 ASSAY OF MAGNESIUM: CPT | Performed by: STUDENT IN AN ORGANIZED HEALTH CARE EDUCATION/TRAINING PROGRAM

## 2017-08-03 RX ADMIN — LEVOTHYROXINE SODIUM 50 MCG: 50 TABLET ORAL at 06:49

## 2017-08-03 RX ADMIN — SERTRALINE HYDROCHLORIDE 25 MG: 50 TABLET ORAL at 08:35

## 2017-08-03 RX ADMIN — DONEPEZIL HYDROCHLORIDE 10 MG: 10 TABLET, FILM COATED ORAL at 21:36

## 2017-08-03 RX ADMIN — AMLODIPINE BESYLATE 5 MG: 5 TABLET ORAL at 08:35

## 2017-08-03 RX ADMIN — DOXAZOSIN 1 MG: 2 TABLET ORAL at 08:35

## 2017-08-03 RX ADMIN — MEMANTINE 10 MG: 5 TABLET ORAL at 21:36

## 2017-08-03 RX ADMIN — CEFTRIAXONE 1 G: 1 INJECTION, POWDER, FOR SOLUTION INTRAMUSCULAR; INTRAVENOUS at 10:42

## 2017-08-03 RX ADMIN — Medication 10 ML: at 06:54

## 2017-08-03 RX ADMIN — MEMANTINE 10 MG: 5 TABLET ORAL at 08:35

## 2017-08-03 RX ADMIN — CALCIUM CARBONATE-VITAMIN D TAB 500 MG-200 UNIT 1 TABLET: 500-200 TAB at 08:35

## 2017-08-03 RX ADMIN — SODIUM CHLORIDE 75 ML/HR: 900 INJECTION, SOLUTION INTRAVENOUS at 02:40

## 2017-08-03 RX ADMIN — PANTOPRAZOLE SODIUM 40 MG: 40 TABLET, DELAYED RELEASE ORAL at 06:49

## 2017-08-03 RX ADMIN — Medication 10 ML: at 21:36

## 2017-08-03 RX ADMIN — Medication 10 ML: at 14:15

## 2017-08-03 RX ADMIN — SODIUM CHLORIDE 75 ML/HR: 900 INJECTION, SOLUTION INTRAVENOUS at 18:23

## 2017-08-03 RX ADMIN — MULTIPLE VITAMINS W/ MINERALS TAB 1 TABLET: TAB at 08:35

## 2017-08-03 RX ADMIN — ENOXAPARIN SODIUM 30 MG: 30 INJECTION SUBCUTANEOUS at 14:15

## 2017-08-03 RX ADMIN — FERROUS SULFATE TAB 325 MG (65 MG ELEMENTAL FE) 325 MG: 325 (65 FE) TAB at 08:35

## 2017-08-03 NOTE — PROGRESS NOTES
Physical Therapy     Order received, chart reviewed. Met with pt and pt daughter. Daughter reports patient is total assist for mobility and raudel lift is used to transfer from bed to chair daily. She states patient sleeps the majority of the time. Explained role of physical therapist and determined patient does not need PT services at this time. Daughter in agreement with this plan.      Emperatriz Ramirez, PT, DPT  Time spent: 11 minutes

## 2017-08-03 NOTE — PROGRESS NOTES
Problem: Dysphagia (Adult)  Goal: *Acute Goals and Plan of Care (Insert Text)  Speech therapy goals  Initiated 8/3/2017   1. Patient will tolerate puree/thin liquid diet without s/s of aspiration within 7 days  [address] EVALUATION  Patient: Karyn Nava (86 y.o. female)  Date: 8/3/2017  Primary Diagnosis: Seizure ([address]        Precautions: aspiration, fall         ASSESSMENT :  Based on the objective data described below, the patient presents with functional swallow for puree and thin liquids. Patient with timely posterior propulsion, timely swallow initiation, and functional hyolaryngeal elevation/excursion via palpation. Per discussion with dtr at bedside, patient has been falling asleep with solid foods in her mouth, chewing for extensive periods of time and then still having food in her mouth an hour later, and preferring pureed foods PTA. Suspect patient was self limiting purposefully and would do best on a pureed diet. dtr in agreement with this. Lengthy discussion regarding cognitive component of dysphagia and dysphagia as it relates to dementia. dtr with appropriate questions all of which were answered. Patient will benefit from skilled intervention to address the above impairments. Patients rehabilitation potential is considered to be Fair  Factors which may influence rehabilitation potential include:   [ ]            None noted  [X]            Mental ability/status  [X]            Medical condition  [ ]            Home/family situation and support systems  [X]            Safety awareness  [ ]            Pain tolerance/management  [ ]            Other:        PLAN :  Recommendations and Planned Interventions:  --puree/thin liquid diet. Only feed when alert, stop if patient falling asleep.   Suspect function will be variable throughout the day  Frequency/Duration: Patient will be followed by speech-language pathology 2 times a week to address goals.  Discharge Recommendations: To Be Determined       SUBJECTIVE:   Patient alert, cooperative. dtr at bedside      OBJECTIVE:       Past Medical History:   Diagnosis Date    Age related osteoporosis      Anemia      CKD (chronic kidney disease)      Conduct disorder      Delusional disorder (HCC)      Dementia      Dizziness and giddiness      DM2 (diabetes mellitus, type 2) ([facility])      Essential hypertension      GERD (gastroesophageal reflux disease)      Hypothyroid      Major depressive disorder      Peripheral vascular disease ([facility])      Restlessness and agitation      Urge incontinence      Vertigo     No past surgical history on file. Prior Level of Function/Home Situation:   Home Situation  Home Environment: Skilled nursing facility  One/Two Story Residence: Other (Comment) (MULTIPLE LEVELS; )  Living Alone: No  Support Systems: Child(leon), Family member(s)  Patient Expects to be Discharged to[de-identified] Skilled nursing facility  Current DME Used/Available at Home: Wheelchair  Diet prior to admission: regular   Current Diet:  NPO    Cognitive and Communication Status:  Neurologic State: Alert, Confused  Orientation Level: Oriented to person, Disoriented to place, Disoriented to situation, Disoriented to time  Cognition: Decreased attention/concentration, Decreased command following  Perception: Appears intact  Perseveration: No perseveration noted  Safety/Judgement: Not assessed  Oral Assessment:  Oral Assessment  Labial: No impairment  Dentition: Limited  Oral Hygiene: moist mucosa   Lingual:  (did not follow commands to assess)  Velum: Unable to visualize  Mandible: No impairment  P.O. Trials:  Patient Position: upright in bed  Vocal quality prior to P.O.: No impairment  Consistency Presented:  Thin liquid;Puree  How Presented: SLP-fed/presented;Spoon;Straw;Successive swallows     Bolus Acceptance: No impairment  Bolus Formation/Control: No impairment     Propulsion: No impairment  Oral Residue: None  Initiation of Swallow: Delayed (# of seconds)  Laryngeal Elevation: Functional  Aspiration Signs/Symptoms: None  Pharyngeal Phase Characteristics: No impairment, issues, or problems         Comments: per dtr, difficulty with solids PTA, falling asleep while eating, pocketing, extensive chewing etc     Oral Phase Severity: No impairment (with puree/thin )  Pharyngeal Phase Severity : No impairment        G Codes: In compliance with CMSs Claims Based Outcome Reporting, the following G-code set was chosen for this patient based the use of the NOMS functional outcome to quantify this patient's level of swallowing impairment. Using the NOMS, the patient was determined to be at level 4 for swallow function which correlates with the CK= 40-59% level of severity. Based on the objective assessment provided within this note, the current, goal, and discharge g-codes are as follows:     Swallow  Swallowing:   Swallow Current Status CK= 40-59%   Swallow Goal Status CK= 40-59%         NOMS Swallowing Levels:  Level 1 (CN): NPO  Level 2 (CM): NPO but takes consistency in therapy  Level 3 (CL): Takes less than 50% of nutrition p.o. and continues with nonoral feedings; and/or safe with mod cues; and/or max diet restriction  Level 4 (CK): Safe swallow but needs mod cues; and/or mod diet restriction; and/or still requires some nonoral feeding/supplements  Level 5 (CJ): Safe swallow with min diet restriction; and/or needs min cues  Level 6 (CI): Independent with p.o.; rare cues; usually self cues; may need to avoid some foods or needs extra time  Level 7 ([address] [place] [address]): Independent for all p.o.  LIZ. (2003). National Outcomes Measurement System (NOMS): Adult Speech-Language Pathology User's Guide.            Pain:  Pain Scale 1: Numeric (0 - 10)  Pain Intensity 1: 0     After treatment:   [ ]            Patient left in no apparent distress sitting up in chair  [X]            Patient left in no apparent distress in bed  [X]            Call bell left within reach  [X]            Nursing notified  [X]            Caregiver present  [ ]            Bed alarm activated      COMMUNICATION/EDUCATION:   The patients plan of care including recommendations, planned interventions, and recommended diet changes were discussed with: Registered Nurse. [X]            Patient/family have participated as able in goal setting and plan of care. [X]            Patient/family agree to work toward stated goals and plan of care. [ ]            Patient understands intent and goals of therapy, but is neutral about his/her participation. [ ]            Patient is unable to participate in goal setting and plan of care. Thank you for this referral.  Cassandra Espinal M.CD.  CCC-SLP   Time Calculation: 12 mins

## 2017-08-03 NOTE — PROGRESS NOTES
Bedside shift change report given to [address] [place] [address] (oncoming nurse) by Michelle Casas (offgoing nurse). Report included the following information SBAR, Intake/Output, MAR, Recent Results and Cardiac Rhythm NSR. I have read and agree with Shavon Norris documentation.

## 2017-08-03 NOTE — PROGRESS NOTES
Primary Nurse Barbie Marr RN and Ana Garcia RN performed a dual skin assessment on this patient No impairment noted  Eleuterio score is 13  Bruises/erythema BLE and BUE

## 2017-08-03 NOTE — PROGRESS NOTES
Hospitalist Progress Note  Jose De Jesus Morales MD  Office: [phone]  Cell: [phone]      Date of Service:  8/3/2017  NAME:  Christopher Cuevas  :  1920  MRN:  [id number]      Admission Summary:   Christopher Cuevas is a 80 y.o. female with history of dementia, HTN, PVD, osteoporosis, DM-2 who presents with altered mental status and abnormal movements. At baseline, patient is reported to be verbal, but non-sensical in speech, ambulatory without swallow issues. Per reports, patient had generally been in her usual state of health until ~1-2 weeks ago when she had started having more frequent falls. She was found by her daughter having twitching movements of her body with inattentiveness (at baseline she is able to look and focus), which is different than her baseline. This had prompted her daughter to bring her to the hospital for further evaluation. There had been a previous incident with altered mental status years ago where she had been diagnosed with urinary tract infection. She was noted to be borderline hypothermic with rectal temperature 95.7 F upon admission. She was noted to have seizure-like activity by ED physician and was given Ativan 1 mg x 1.       Interval history / Subjective:   No seizure since admission      Assessment & Plan: Altered mental status possible related to seizure vs metabolic encephalopathy POA  -CT head show atrophy, significant small vessel ischemic changes, no acute abnormality.   -MRI brain no acute finding  -urine cx positive for gram negative rods, blood cx no growth so far,   -TSH normal  -seen by neurologist    Convulsion possible seizure  -on prn ativan  -EEG non specific  -neurologist is consulted    UTI POA  -on ceftriaxone and IVF  -follow up with urine cx result  -blood cx no growth so far    Lactic acidosis multifactorial  -resolved wit IVF    HTN  -BP normal, continue norvasc, cardura and monitor BP    DM-II  -continue sliding scale and monitor finger stick glucose   -home glimepiride is held    Hypothyroidism   -on synthroid  -TSH normal    Dementia  -continue namenda and aricept  -non verbal at base line per report  -continue supportive care      Code status: DNR  DVT prophylaxis: Lovenox    Care Plan discussed with: Patient/Family, Nurse and   Disposition: Patient from [address] [place] [address] [facility]     Hospital Problems  Never Reviewed          Codes Class Noted POA    * (Principal)Seizure ([facility] ICD-10-CM: R56.9  ICD-9-CM: 780.39  8/2/2017 Yes        UTI (urinary tract infection) ICD-10-CM: N39.0  ICD-9-CM: 599.0  8/2/2017 Yes        Dementia (Chronic) ICD-10-CM: F03.90  ICD-9-CM: 294.20  8/2/2017 Yes        Hypothyroidism (Chronic) ICD-10-CM: E03.9  ICD-9-CM: 244.9  8/2/2017 Yes        HTN (hypertension) (Chronic) ICD-10-CM: I10  ICD-9-CM: 401.9  8/2/2017 Yes        PVD (peripheral vascular disease) ([facility] (Chronic) ICD-10-CM: I73.9  ICD-9-CM: 443.9  8/2/2017 Unknown              Vital Signs:    Last 24hrs VS reviewed since prior progress note. Most recent are:  Visit Vitals    /46    Pulse 67    Temp 98.7 °F (37.1 °C)    Resp 16    Ht 5' (1.524 m)    Wt 67.4 kg (148 lb 9.4 oz)    SpO2 95%    BMI 29.02 kg/m2         Intake/Output Summary (Last 24 hours) at 08/03/17 0747  Last data filed at 08/03/17 0700   Gross per 24 hour   Intake             1800 ml   Output             1826 ml   Net              -26 ml        Physical Examination:             Constitutional:  No acute distress, cooperative, pleasant    ENT:  Oral mucous moist, oropharynx benign. Neck supple,    Resp:  Decrease bronchial breath sound bilaterally. No wheezing/rhonchi/rales. No accessory muscle use   CV:  Regular rhythm, normal rate, no murmurs, gallops, rubs    GI:  Soft, non distended, non tender.  normoactive bowel sounds, no hepatosplenomegaly     Musculoskeletal:  No edema    Neurologic:  Conscious and alert, follows simple command     Psych: Not anxious nor agitated. Data Review:    Review and/or order of clinical lab test      Labs:     Recent Labs      08/03/17 0446  08/02/17 0921   WBC  6.5  9.8   HGB  9.2*  11.8   HCT  29.2*  36.5   PLT  179  228     Recent Labs      08/03/17 0446  08/02/17 0921   NA  142  138   K  3.5  5.2*   CL  108  101   CO2  30  31   BUN  12  21*   CREA  0.88  1.21*   GLU  90  111*   CA  7.8*  9.0   MG  2.0   --      Recent Labs      08/02/17 0921   SGOT  48*   ALT  14   AP  56   TBILI  0.5   TP  7.1   ALB  3.2*   GLOB  3.9     No results for input(s): INR, PTP, APTT in the last 72 hours. No lab exists for component: INREXT   No results for input(s): FE, TIBC, PSAT, FERR in the last 72 hours. No results found for: FOL, RBCF   No results for input(s): PH, PCO2, PO2 in the last 72 hours.   Recent Labs      08/02/17 0921   TROIQ  <0.04     No results found for: CHOL, CHOLX, CHLST, CHOLV, HDL, LDL, LDLC, DLDLP, TGLX, TRIGL, TRIGP, CHHD, CHHDX  Lab Results   Component Value Date/Time    Glucose (POC) 94 08/03/2017 06:50 AM    Glucose (POC) 99 08/02/2017 09:22 PM    Glucose (POC) 91 08/02/2017 04:52 PM    Glucose (POC) 101 08/02/2017 02:08 PM    Glucose (POC) 110 08/02/2017 09:17 AM     Lab Results   Component Value Date/Time    Color YELLOW/STRAW 08/02/2017 09:21 AM    Appearance TURBID 08/02/2017 09:21 AM    Specific gravity 1.018 08/02/2017 09:21 AM    pH (UA) 5.5 08/02/2017 09:21 AM    Protein 30 08/02/2017 09:21 AM    Glucose NEGATIVE  08/02/2017 09:21 AM    Ketone NEGATIVE  08/02/2017 09:21 AM    Bilirubin NEGATIVE  08/02/2017 09:21 AM    Urobilinogen 0.2 08/02/2017 09:21 AM    Nitrites NEGATIVE  08/02/2017 09:21 AM    Leukocyte Esterase LARGE 08/02/2017 09:21 AM    Epithelial cells FEW 08/02/2017 09:21 AM    Bacteria 4+ 08/02/2017 09:21 AM    WBC >100 08/02/2017 09:21 AM    RBC 0-5 08/02/2017 09:21 AM         Medications Reviewed:     Current Facility-Administered Medications   Medication Dose Route Frequency    cefTRIAXone (ROCEPHIN) 1 g in 0.9% sodium chloride (MBP/ADV) 50 mL  1 g IntraVENous Q24H    acetaminophen (TYLENOL) tablet 650 mg  650 mg Oral Q6H PRN    amLODIPine (NORVASC) tablet 5 mg  5 mg Oral DAILY    calcium-vitamin D (OS-KEE) 500 mg-200 unit tablet  1 Tab Oral DAILY    ferrous sulfate tablet 325 mg  325 mg Oral DAILY    levothyroxine (SYNTHROID) tablet 50 mcg  50 mcg Oral ACB    multivitamin, tx-iron-ca-min (THERA-M w/ IRON) tablet 1 Tab  1 Tab Oral DAILY    pantoprazole (PROTONIX) tablet 40 mg  40 mg Oral ACB    sertraline (ZOLOFT) tablet 25 mg  25 mg Oral DAILY    sodium chloride (NS) flush 5-10 mL  5-10 mL IntraVENous Q8H    sodium chloride (NS) flush 5-10 mL  5-10 mL IntraVENous PRN    insulin lispro (HUMALOG) injection   SubCUTAneous AC&HS    glucose chewable tablet 16 g  4 Tab Oral PRN    glucagon (GLUCAGEN) injection 1 mg  1 mg IntraMUSCular PRN    LORazepam (ATIVAN) injection 1 mg  1 mg IntraVENous PRN    0.9% sodium chloride infusion  75 mL/hr IntraVENous CONTINUOUS    memantine (NAMENDA) tablet 10 mg  10 mg Oral BID    And    donepezil (ARICEPT) tablet 10 mg  10 mg Oral QHS    dextrose 10 % infusion 125-250 mL  125-250 mL IntraVENous PRN    enoxaparin (LOVENOX) injection 30 mg  30 mg SubCUTAneous Q24H    doxazosin (CARDURA) tablet 1 mg  1 mg Oral DAILY     ______________________________________________________________________  EXPECTED LENGTH OF STAY: - - -  ACTUAL LENGTH OF STAY:          1                 Salinas Monteiro MD

## 2017-08-03 NOTE — PROGRESS NOTES
TRANSFER - IN REPORT:    Verbal report received from Jermaine Chiang RN(name) on Peter Dus  being received from ICU(unit) for routine progression of care      Report consisted of patients Situation, Background, Assessment and   Recommendations(SBAR). Information from the following report(s) SBAR, Intake/Output and Recent Results was reviewed with the receiving nurse. Opportunity for questions and clarification was provided. Assessment completed upon patients arrival to unit and care assumed.

## 2017-08-03 NOTE — PROGRESS NOTES
SPEECH THERAPY SCREENING:  SERVICES ARE NOT INDICATED AT THIS TIME    [facility] triggered for speech therapy based on results obtained during the nursing admission assessment. The patients chart was reviewed and the patient is not appropriate for a skilled therapy evaluation at this time. Please consult speech therapy if any therapy needs arise. Thank you.     Zi Green, SLP

## 2017-08-03 NOTE — PROGRESS NOTES
2000:  Received patient in bed with daughter at bedside. A&O x1, COPPOLA, focuses and tracks with eyes. Pope draining cleat. Yellow urine. 2115:  MRI called requesting patient come to MRI now. Will send for transport. 2130:  JOANNA to MRI    2215:  Return from MRI.    2400:  Assessment unchanged. 0400:  Assessment unchanged. Shift summary:  Disoriented x3. Oriented to self. ARDEN COPPOLA. Pope draining  mL/hr, clear, yellow urine. Normothermic. MRI completed.   Awaiting transfer to [facility]

## 2017-08-03 NOTE — PROGRESS NOTES
7:45-Bedside and Verbal shift change report given to Jyotsna Rivera (oncoming nurse) by Shorty Hooper (offgoing nurse). Report included the following information SBAR, Kardex, ED Summary, Procedure Summary, Intake/Output, MAR, Accordion, Recent Results, Med Rec Status, Cardiac Rhythm SR and Alarm Parameters    13:20-TRANSFER - OUT REPORT:    Verbal report given to Leah(name) betty Lowe  being transferred to [facility](unit) for routine progression of care       Report consisted of patients Situation, Background, Assessment and   Recommendations(SBAR). Information from the following report(s) SBAR, Kardex, ED Summary, Procedure Summary, Intake/Output, MAR, Accordion, Recent Results, Med Rec Status, Cardiac Rhythm SR and Alarm Parameters  was reviewed with the receiving nurse. Lines:   Peripheral IV 08/02/17 Left Forearm (Active)   Site Assessment Clean, dry, & intact 8/3/2017 12:00 PM   Phlebitis Assessment 0 8/3/2017 12:00 PM   Infiltration Assessment 0 8/3/2017 12:00 PM   Dressing Status Clean, dry, & intact 8/3/2017 12:00 PM   Dressing Type Transparent;Tape 8/3/2017 12:00 PM   Hub Color/Line Status Pink; Infusing 8/3/2017 12:00 PM   Action Taken Open ports on tubing capped 8/3/2017 12:00 PM   Alcohol Cap Used Yes 8/3/2017 12:00 PM        Opportunity for questions and clarification was provided.       Patient transported with:   Monitor  Registered Nurse  Tech

## 2017-08-03 NOTE — PROGRESS NOTES
Rounded on [facility] patients and provided [facility] at request of family.     Celestine Antonio

## 2017-08-03 NOTE — ROUTINE PROCESS
1930:  Bedside and Verbal shift change report given to Olesya Waller RN (oncoming nurse) by Deanna Cheung RN (offgoing nurse). Report included the following information SBAR, Kardex, ED Summary, Procedure Summary, Intake/Output, MAR, Accordion, Recent Results, Med Rec Status and Cardiac Rhythm sinus rhythm.

## 2017-08-03 NOTE — PROGRESS NOTES
Problem: Discharge Planning  Goal: *Discharge to safe environment  Outcome: Progressing Towards Goal  Return to long term care.

## 2017-08-03 NOTE — PROGRESS NOTES
Occupational Therapy    Order received, chart reviewed. Met with pt and pt daughter. Daughter reports pt is dependent for transfers at baseline, relies on a power chair for mobility, and sleeps the majority of the day. She states pt will feed herself at baseline. Since admit to [facility], she has remained lethargic, minimally conversant with her daughter, and has not be able to take in PO 2* to mental status. OT will continue to follow peripherally to access feeding once pt appropriate for PO. Daughter in agreement with this plan.      Simeon Dash, OTR/L  Time spent: 10 minutes

## 2017-08-04 LAB
BACTERIA SPEC CULT: ABNORMAL
CC UR VC: ABNORMAL
GLUCOSE BLD STRIP.AUTO-MCNC: 114 MG/DL (ref 65–100)
GLUCOSE BLD STRIP.AUTO-MCNC: 118 MG/DL (ref 65–100)
GLUCOSE BLD STRIP.AUTO-MCNC: 126 MG/DL (ref 65–100)
GLUCOSE BLD STRIP.AUTO-MCNC: 131 MG/DL (ref 65–100)
SERVICE CMNT-IMP: ABNORMAL

## 2017-08-04 PROCEDURE — 74011250636 HC RX REV CODE- 250/636: Performed by: HOSPITALIST

## 2017-08-04 PROCEDURE — 97165 OT EVAL LOW COMPLEX 30 MIN: CPT

## 2017-08-04 PROCEDURE — 74011250636 HC RX REV CODE- 250/636: Performed by: STUDENT IN AN ORGANIZED HEALTH CARE EDUCATION/TRAINING PROGRAM

## 2017-08-04 PROCEDURE — 65660000000 HC RM CCU STEPDOWN

## 2017-08-04 PROCEDURE — 74011000258 HC RX REV CODE- 258: Performed by: EMERGENCY MEDICINE

## 2017-08-04 PROCEDURE — 74011250636 HC RX REV CODE- 250/636: Performed by: EMERGENCY MEDICINE

## 2017-08-04 PROCEDURE — 74011250637 HC RX REV CODE- 250/637: Performed by: STUDENT IN AN ORGANIZED HEALTH CARE EDUCATION/TRAINING PROGRAM

## 2017-08-04 PROCEDURE — 97535 SELF CARE MNGMENT TRAINING: CPT

## 2017-08-04 PROCEDURE — 82962 GLUCOSE BLOOD TEST: CPT

## 2017-08-04 RX ADMIN — CEFTRIAXONE 1 G: 1 INJECTION, POWDER, FOR SOLUTION INTRAMUSCULAR; INTRAVENOUS at 09:10

## 2017-08-04 RX ADMIN — CALCIUM CARBONATE-VITAMIN D TAB 500 MG-200 UNIT 1 TABLET: 500-200 TAB at 09:09

## 2017-08-04 RX ADMIN — DONEPEZIL HYDROCHLORIDE 10 MG: 10 TABLET, FILM COATED ORAL at 21:07

## 2017-08-04 RX ADMIN — SERTRALINE HYDROCHLORIDE 25 MG: 50 TABLET ORAL at 09:10

## 2017-08-04 RX ADMIN — PANTOPRAZOLE SODIUM 40 MG: 40 TABLET, DELAYED RELEASE ORAL at 05:30

## 2017-08-04 RX ADMIN — MULTIPLE VITAMINS W/ MINERALS TAB 1 TABLET: TAB at 09:09

## 2017-08-04 RX ADMIN — FERROUS SULFATE TAB 325 MG (65 MG ELEMENTAL FE) 325 MG: 325 (65 FE) TAB at 09:09

## 2017-08-04 RX ADMIN — Medication 10 ML: at 21:08

## 2017-08-04 RX ADMIN — Medication 10 ML: at 05:28

## 2017-08-04 RX ADMIN — AMLODIPINE BESYLATE 5 MG: 5 TABLET ORAL at 09:09

## 2017-08-04 RX ADMIN — ENOXAPARIN SODIUM 30 MG: 30 INJECTION SUBCUTANEOUS at 14:00

## 2017-08-04 RX ADMIN — MEMANTINE 10 MG: 5 TABLET ORAL at 09:09

## 2017-08-04 RX ADMIN — SODIUM CHLORIDE 50 ML/HR: 900 INJECTION, SOLUTION INTRAVENOUS at 21:06

## 2017-08-04 RX ADMIN — DOXAZOSIN 1 MG: 2 TABLET ORAL at 09:09

## 2017-08-04 RX ADMIN — LEVOTHYROXINE SODIUM 50 MCG: 50 TABLET ORAL at 05:30

## 2017-08-04 RX ADMIN — MEMANTINE 10 MG: 5 TABLET ORAL at 21:07

## 2017-08-04 NOTE — PROGRESS NOTES
Hospitalist Progress Note  Bridget Sheffield MD  Office: [phone]  Cell: [phone]      Date of Service:  2017  NAME:  Waleska Camara  :  1920  MRN:  [id number]      Admission Summary:   Waleska Camara is a 80 y.o. female with history of dementia, HTN, PVD, osteoporosis, DM-2 who presents with altered mental status and abnormal movements. At baseline, patient is reported to be verbal, but non-sensical in speech, ambulatory without swallow issues. Per reports, patient had generally been in her usual state of health until ~1-2 weeks ago when she had started having more frequent falls. She was found by her daughter having twitching movements of her body with inattentiveness (at baseline she is able to look and focus), which is different than her baseline. This had prompted her daughter to bring her to the hospital for further evaluation. There had been a previous incident with altered mental status years ago where she had been diagnosed with urinary tract infection. She was noted to be borderline hypothermic with rectal temperature 95.7 F upon admission. She was noted to have seizure-like activity by ED physician and was given Ativan 1 mg x 1.       Interval history / Subjective:   No seizure since admission      Assessment & Plan: Altered mental status possible metabolic encephalopathy POA  -patient is conscious and alert, follow simple command  -CT head show atrophy, significant small vessel ischemic changes, no acute abnormality.   -MRI brain no acute finding  -urine cx positive for gram negative rods, blood cx no growth so far,   -TSH normal  -may d/c zaldivar in am 8/5  -seen by neurologist    Convulsion unclear etiology possible due to UTI  -on prn ativan  -EEG non specific  -unlikely seizure per neurologist  -seen by neurologist     UTI POA  -on ceftriaxone and IVF  -urine cx grow e coli sensitive to ceftriaxone  -blood cx no growth so far    Lactic acidosis multifactorial  -resolved wit IVF    HTN  -BP normal, continue norvasc, cardura and monitor BP    DM-II  -continue sliding scale and monitor finger stick glucose   -check A1c  -home glimepiride is held    Hypothyroidism   -continue synthroid  -TSH normal    Dementia  -continue namenda and aricept  -non verbal at base line per report  -continue supportive care      Code status: DNR  DVT prophylaxis: Lovenox    Care Plan discussed with: Patient/Family, Nurse and   Disposition: Patient from [address] [facility]     Hospital Problems  Never Reviewed          Codes Class Noted POA    * (Principal)Seizure ([facility] ICD-10-CM: R56.9  ICD-9-CM: 780.39  8/2/2017 Yes        UTI (urinary tract infection) ICD-10-CM: N39.0  ICD-9-CM: 599.0  8/2/2017 Yes        Dementia (Chronic) ICD-10-CM: F03.90  ICD-9-CM: 294.20  8/2/2017 Yes        Hypothyroidism (Chronic) ICD-10-CM: E03.9  ICD-9-CM: 244.9  8/2/2017 Yes        HTN (hypertension) (Chronic) ICD-10-CM: I10  ICD-9-CM: 401.9  8/2/2017 Yes        [facility] (Chronic) ICD-10-CM: I73.9  ICD-9-CM: 443.9  8/2/2017 Unknown              Vital Signs:    Last 24hrs VS reviewed since prior progress note. Most recent are:  Visit Vitals    /68 (BP 1 Location: Left arm, BP Patient Position: At rest)    Pulse 78    Temp 98.8 °F (37.1 °C)    Resp 20    Ht 5' (1.524 m)    Wt 68.4 kg (150 lb 12.7 oz)    SpO2 97%    BMI 29.45 kg/m2         Intake/Output Summary (Last 24 hours) at 08/04/17 1727  Last data filed at 08/04/17 0830   Gross per 24 hour   Intake             1830 ml   Output             1900 ml   Net              -70 ml        Physical Examination:             Constitutional:  No acute distress, cooperative, pleasant    ENT:  Oral mucous moist, oropharynx benign. Neck supple,    Resp:  Decrease bronchial breath sound bilaterally. No wheezing/rhonchi/rales.  No accessory muscle use   CV:  Regular rhythm, normal rate, no murmurs, gallops, rubs    GI:  Soft, non distended, non tender. normoactive bowel sounds, no hepatosplenomegaly     Musculoskeletal:  No edema    Neurologic:  Conscious and alert, follows simple command     Psych: Not anxious nor agitated. Data Review:    Review and/or order of clinical lab test      Labs:     Recent Labs      08/03/17 0446 08/02/17 0921   WBC  6.5  9.8   HGB  9.2*  11.8   HCT  29.2*  36.5   PLT  179  228     Recent Labs      08/03/17 0446 08/02/17 0921   NA  142  138   K  3.5  5.2*   CL  108  101   CO2  30  31   BUN  12  21*   CREA  0.88  1.21*   GLU  90  111*   CA  7.8*  9.0   MG  2.0   --      Recent Labs      08/02/17 0921   SGOT  48*   ALT  14   AP  56   TBILI  0.5   TP  7.1   ALB  3.2*   GLOB  3.9     No results for input(s): INR, PTP, APTT in the last 72 hours. No lab exists for component: INREXT, INREXT   No results for input(s): FE, TIBC, PSAT, FERR in the last 72 hours. No results found for: FOL, RBCF   No results for input(s): PH, PCO2, PO2 in the last 72 hours.   Recent Labs      08/02/17 0921   TROIQ  <0.04     No results found for: CHOL, CHOLX, CHLST, CHOLV, HDL, LDL, LDLC, DLDLP, TGLX, TRIGL, TRIGP, CHHD, CHHDX  Lab Results   Component Value Date/Time    Glucose (POC) 126 08/04/2017 05:11 PM    Glucose (POC) 118 08/04/2017 12:17 PM    Glucose (POC) 115 08/03/2017 09:29 PM    Glucose (POC) 97 08/03/2017 04:51 PM    Glucose (POC) 93 08/03/2017 11:58 AM     Lab Results   Component Value Date/Time    Color YELLOW/STRAW 08/02/2017 09:21 AM    Appearance TURBID 08/02/2017 09:21 AM    Specific gravity 1.018 08/02/2017 09:21 AM    pH (UA) 5.5 08/02/2017 09:21 AM    Protein 30 08/02/2017 09:21 AM    Glucose NEGATIVE  08/02/2017 09:21 AM    Ketone NEGATIVE  08/02/2017 09:21 AM    Bilirubin NEGATIVE  08/02/2017 09:21 AM    Urobilinogen 0.2 08/02/2017 09:21 AM    Nitrites NEGATIVE  08/02/2017 09:21 AM    Leukocyte Esterase LARGE 08/02/2017 09:21 AM    Epithelial cells FEW 08/02/2017 09:21 AM    Bacteria 4+ 08/02/2017 09:21 AM    WBC >100 08/02/2017 09:21 AM    RBC 0-5 08/02/2017 09:21 AM         Medications Reviewed:     Current Facility-Administered Medications   Medication Dose Route Frequency    cefTRIAXone (ROCEPHIN) 1 g in 0.9% sodium chloride (MBP/ADV) 50 mL  1 g IntraVENous Q24H    acetaminophen (TYLENOL) tablet 650 mg  650 mg Oral Q6H PRN    amLODIPine (NORVASC) tablet 5 mg  5 mg Oral DAILY    calcium-vitamin D (OS-KEE) 500 mg-200 unit tablet  1 Tab Oral DAILY    ferrous sulfate tablet 325 mg  325 mg Oral DAILY    levothyroxine (SYNTHROID) tablet 50 mcg  50 mcg Oral ACB    multivitamin, tx-iron-ca-min (THERA-M w/ IRON) tablet 1 Tab  1 Tab Oral DAILY    pantoprazole (PROTONIX) tablet 40 mg  40 mg Oral ACB    sertraline (ZOLOFT) tablet 25 mg  25 mg Oral DAILY    sodium chloride (NS) flush 5-10 mL  5-10 mL IntraVENous Q8H    sodium chloride (NS) flush 5-10 mL  5-10 mL IntraVENous PRN    insulin lispro (HUMALOG) injection   SubCUTAneous AC&HS    glucose chewable tablet 16 g  4 Tab Oral PRN    glucagon (GLUCAGEN) injection 1 mg  1 mg IntraMUSCular PRN    LORazepam (ATIVAN) injection 1 mg  1 mg IntraVENous PRN    0.9% sodium chloride infusion  75 mL/hr IntraVENous CONTINUOUS    memantine (NAMENDA) tablet 10 mg  10 mg Oral BID    And    donepezil (ARICEPT) tablet 10 mg  10 mg Oral QHS    dextrose 10 % infusion 125-250 mL  125-250 mL IntraVENous PRN    enoxaparin (LOVENOX) injection 30 mg  30 mg SubCUTAneous Q24H    doxazosin (CARDURA) tablet 1 mg  1 mg Oral DAILY     ______________________________________________________________________  EXPECTED LENGTH OF STAY: 2d 16h  ACTUAL LENGTH OF STAY:          2                 Ezequiel Gregg MD

## 2017-08-04 NOTE — PROGRESS NOTES
Neurology Progress Note    Patient: Chi Mascorro MRN: [id number]  SSN: [id number]    YOB: 1920  Age: 80 y.o. Sex: female      Admit Date: 8/2/2017    LOS: 2 days     Subjective:     81 yo female admitted for UTI and neurology consulted for possible seizure acitivity. Pt  Dx with dementia, aphasia, WC dependent. Daughter noted episodic generalized myoclonic activity and less interactive which prompted ED visit. Pt admitted x 2 days, no further twitching or seizure like activity. Continues to receive Rocephin IV daily. UCx positive for gram positive rods. Afebrile. More interactive per daughter. Continues to be less cooperative and weak. Daughter at bedside , multiple questions about recovery and returning to her baseline which is limited at best.    EEG non specific. TSH normal. Able to speak one two words, non cooperative, inattentive. Daughter providing most of HPI. Incidentally, parent of Madeline's in [place]. ROS:     A comprehensive ROS was performed and was negative except for as per HPI.     Objective:     Current Facility-Administered Medications   Medication Dose Route Frequency    cefTRIAXone (ROCEPHIN) 1 g in 0.9% sodium chloride (MBP/ADV) 50 mL  1 g IntraVENous Q24H    acetaminophen (TYLENOL) tablet 650 mg  650 mg Oral Q6H PRN    amLODIPine (NORVASC) tablet 5 mg  5 mg Oral DAILY    calcium-vitamin D (OS-KEE) 500 mg-200 unit tablet  1 Tab Oral DAILY    ferrous sulfate tablet 325 mg  325 mg Oral DAILY    levothyroxine (SYNTHROID) tablet 50 mcg  50 mcg Oral ACB    multivitamin, tx-iron-ca-min (THERA-M w/ IRON) tablet 1 Tab  1 Tab Oral DAILY    pantoprazole (PROTONIX) tablet 40 mg  40 mg Oral ACB    sertraline (ZOLOFT) tablet 25 mg  25 mg Oral DAILY    sodium chloride (NS) flush 5-10 mL  5-10 mL IntraVENous Q8H    sodium chloride (NS) flush 5-10 mL  5-10 mL IntraVENous PRN    insulin lispro (HUMALOG) injection   SubCUTAneous AC&HS    glucose chewable tablet 16 g  4 Tab Oral PRN    glucagon (GLUCAGEN) injection 1 mg  1 mg IntraMUSCular PRN    LORazepam (ATIVAN) injection 1 mg  1 mg IntraVENous PRN    0.9% sodium chloride infusion  75 mL/hr IntraVENous CONTINUOUS    memantine (NAMENDA) tablet 10 mg  10 mg Oral BID    And    donepezil (ARICEPT) tablet 10 mg  10 mg Oral QHS    dextrose 10 % infusion 125-250 mL  125-250 mL IntraVENous PRN    enoxaparin (LOVENOX) injection 30 mg  30 mg SubCUTAneous Q24H    doxazosin (CARDURA) tablet 1 mg  1 mg Oral DAILY       Patient Vitals for the past 24 hrs:   Temp Pulse Resp BP SpO2   08/04/17 1100 97.5 °F (36.4 °C) 77 18 148/69 97 %   08/04/17 0700 97.2 °F (36.2 °C) 88 18 107/61 -   08/04/17 0300 97.5 °F (36.4 °C) 89 16 131/59 -   08/03/17 2330 98 °F (36.7 °C) 88 18 105/57 -   08/03/17 1900 97.9 °F (36.6 °C) 84 20 124/79 -   08/03/17 1500 97.9 °F (36.6 °C) 66 14 107/63 -   08/03/17 1458 97.9 °F (36.6 °C) 66 14 107/63 -        Intake and Output:    Intake/Output Summary (Last 24 hours) at 08/04/17 1412  Last data filed at 08/04/17 0830   Gross per 24 hour   Intake             1830 ml   Output             1900 ml   Net              -70 ml       Physical Exam:  General appearance: alert, distracted, no distress, appears stated age  Head: Normocephalic, without obvious abnormality, atraumatic  Lungs: clear to auscultation bilaterally  Heart: regular rate and rhythm, S1, S2 normal, no murmur, click, rub or gallop  Friable skin integrity  Large contusion on LLE anterior surface      Neurological Exam:   AF VSS; NAD. A&O x 1,  1 -2 word statements, easily distracted,  Inconsistent following commands  No ptosis, PERRL,   unable to check EOMS, does track appropriately   hand  equal  Unable to test motor strength due to NO FC  Symmetrical face  Hearing appears intact  2+ distal pulses BLE.    Gait deferred     Lab/Data Review:   Recent Results (from the past 24 hour(s))   GLUCOSE, POC    Collection Time: 08/03/17  4:51 PM   Result Value Ref Range    Glucose (POC) 97 65 - 100 mg/dL    Performed by Sanaz Roman    GLUCOSE, POC    Collection Time: 08/03/17  9:29 PM   Result Value Ref Range    Glucose (POC) 115 (H) 65 - 100 mg/dL    Performed by [address], POC    Collection Time: 08/04/17 12:17 PM   Result Value Ref Range    Glucose (POC) 118 (H) 65 - 100 mg/dL    Performed by Karlo Bachelor        Imaging Reviewed:       MRI head 8/2/2017:    IMPRESSION:  Extensive chronic white matter disease, chronic small right cerebellar  infarctions, and diffuse atrophy. No evidence of acute infarction. Assessment:     Principal Problem:    Seizure ([facility] [address] (8/2/2017)    Active Problems:    UTI (urinary tract infection) (8/2/2017)      Dementia (8/2/2017)      Hypothyroidism (8/2/2017)      HTN (hypertension) (8/2/2017)      PVD (peripheral vascular disease) ([facility] [address] (8/2/2017)      Plan:     1. Overall improvement noted in patients behavior and appears she has returned to baseline other than generalized weakness. 2. Unlikely seizure activity as noted per negative EEG, and no further witnessed myoclonic  Twitching - therefore no AED indicated    3. Long discussion with daughter about signs and sx of UTI and the adverse signs and sx that can accompany that diagnosis in a elderly person that may is aphasic. 4. Daughter concerned about returning to [address] [place] [address] [facility] and the possibility of decreased attention at bedside that may be needed. Inquired about a sitter for turns and safety of WC. I informed her that PT, ST and OT would make those recommendations and case management can assist with possible potential needs. Daughter appeared comforted with a sitter option even if for a temporary status. 5. Informed daughter, no evidence of a stroke and hospitalizations can be difficult transitioning for patients with dementia. Informed her no further follow up needed with neurology for this episode.  Continue to take Namenda and Aricept. The daughter expressed understanding of the disease process and management plan, and all questions were answered.  Greater than 40 minutes were spent in patient management, greater than half of which was spent in counseling and coordination of care.      Signed By: Bozena Daugherty NP     August 4, 2017

## 2017-08-04 NOTE — PROGRESS NOTES
Reviewed chart and discussed case with nschristopher who reports patient is tolerating her pureed diet well without s/s of aspiration. Remains drowsy and intake remains limited depending on mental status. Will follow up next week to determine if any further educational needs present. Suspect purees will be new baseline diet based on discussion with dtr yesterday. Rachael Zacarias M.CD.  CCC-SLP

## 2017-08-04 NOTE — PROGRESS NOTES
Occupational Therapy EVALUATION/discharge  Patient: Bishop Terry (16 y.o. female)  Date: 8/4/2017  Primary Diagnosis: Seizure [facility])        Precautions:        ASSESSMENT:   Based on the objective data described below, the patient presents with overall Total A for functional mobility (raudel lift at baseline) and overall Max A for ADLs. However patient at baseline is able to feed self. Patient able to demonstrate ability to feed self with close CGA to avoid spilling liquids. Patient minimal conversant and daughter reporting she was appearing more like her baseline today. Recommend food placed on tray and positioned directly in front of patient with simple one word cuing to initiate command following. Patient with no further acute OT needs at this time. Further skilled acute occupational therapy is not indicated at this time. Discharge Recommendations: Return to SNF  Further Equipment Recommendations for Discharge: None noted      SUBJECTIVE:   Patient stated [facility] mother taught me how to knit.     OBJECTIVE DATA SUMMARY:   HISTORY:   Past Medical History:   Diagnosis Date    Age related osteoporosis     Anemia     CKD (chronic kidney disease)     Conduct disorder     Delusional disorder (HCC)     Dementia     Dizziness and giddiness     DM2 (diabetes mellitus, type 2) ([facility] [address] [facility]     Essential hypertension     GERD (gastroesophageal reflux disease)     Hypothyroid     Major depressive disorder     Peripheral vascular disease (HCC)     Restlessness and agitation     Urge incontinence     Vertigo    No past surgical history on file. Prior Level of Function/Home Situation: Per patient's daughter, Total A with raudel lift at baseline. Is verbal but intermittently disoriented x4, sometimes able to state her name and recall some long term memories. Feeds self.   Expanded or extensive additional review of patient history:     Home Situation  Home Environment: Skilled nursing facility  One/Two Story Residence: Other (Comment) (MULTIPLE LEVELS; )  Living Alone: No  Support Systems: Child(leon), Family member(s)  Patient Expects to be Discharged to[de-identified] Skilled nursing facility  Current DME Used/Available at Home: Wheelchair  []  Right hand dominant   []  Left hand dominant    EXAMINATION OF PERFORMANCE DEFICITS:  Cognitive/Behavioral Status:  Neurologic State: Confused  Orientation Level: Unable to verbalize (Unable to fully answer orientation questions)  Cognition: Follows commands;Decreased attention/concentration     Perseveration: No perseveration noted  Safety/Judgement: Not assessed    Skin: Appears intact    Edema: None noted in BUEs    Hearing: Auditory  Auditory Impairment: None    Vision/Perceptual:                           Acuity: Within Defined Limits         Range of Motion:  AROM: Generally decreased, functional  PROM: Within functional limits                      Strength:  Strength: Generally decreased, functional                Coordination:  Coordination: Generally decreased, functional  Fine Motor Skills-Upper: Left Intact; Right Intact    Gross Motor Skills-Upper: Left Intact; Right Intact    Tone & Sensation:  Tone: Normal  Sensation: Intact                      Balance:  Sitting: Impaired    Functional Mobility and Transfers for ADLs:  Bed Mobility:       Transfers:  Sit to Stand: Total assistance  Toilet Transfer : Total assistance    ADL Assessment:  Feeding: Minimum assistance    Oral Facial Hygiene/Grooming: Minimum assistance    Bathing: Total assistance    Upper Body Dressing: Total assistance    Lower Body Dressing: Total assistance    Toileting:  Total assistance                ADL Intervention and task modifications:  Feeding  Drink to Mouth: Contact guard assistance      Cognitive Retraining  Safety/Judgement: Not assessed    Occupational Therapy Evaluation Charge Determination   History Examination Decision-Making   LOW Complexity : Brief history review  LOW Complexity : 1-3 performance deficits relating to physical, cognitive , or psychosocial skils that result in activity limitations and / or participation restrictions  LOW Complexity : No comorbidities that affect functional and no verbal or physical assistance needed to complete eval tasks       Based on the above components, the patient evaluation is determined to be of the following complexity level: LOW   Pain:  Pain Scale 1: Numeric (0 - 10)  Pain Intensity 1: 0              Activity Tolerance:   Good. Please refer to the flowsheet for vital signs taken during this treatment. After treatment:   []  Patient left in no apparent distress sitting up in chair  [x]  Patient left in no apparent distress in bed  [x]  Call bell left within reach  [x]  Nursing notified  [x]  Caregiver present  []  Bed alarm activated    COMMUNICATION/EDUCATION:   Communication/Collaboration:  []      Home safety education was provided and the patient/caregiver indicated understanding. []      Patient/family have participated as able and agree with findings and recommendations. [x]      Patient is unable to participate in plan of care at this time.   Findings and recommendations were discussed with: Registered Nurse    Danie Conroy OT  Time Calculation: 16 mins

## 2017-08-04 NOTE — PROGRESS NOTES
In the event patient requires ambulance transport:    Per patient face sheet from [facility]:      [facility] - [id number]    [facility]  [id number]    Medicaid - [id number]    Tara Morrow RN

## 2017-08-04 NOTE — ROUTINE PROCESS
IDR/SLIDR Summary          Patient: Waleska Camara MRN: [id number]    Age: 80 y.o. YOB: 1920 Room/Bed: [facility]   Admit Diagnosis: Seizure ([facility]  Principal Diagnosis: Seizure ([facility]   Goals: LSOP  Readmission: NO  Quality Measure: SEPSIS  VTE Prophylaxis: Chemical  Influenza Vaccine screening completed? YES  Pneumococcal Vaccine screening completed? NO  Mobility needs: Yes   Nutrition plan:Yes  Consults:P.T, O.T., Speech, Respiratory and Case Management    Financial concerns:No  Escalated to CM? YES  RRAT Score:    Interventions:Palliative Care   Testing due for pt today?  NO  LOS: 2 days Expected length of stay 3 days  Discharge plan: LSOP   PCP: Jennyfer Ball MD  Transportation needs: Yes    Days before discharge:one day until discharge   Discharge disposition: SNF    Signed:     Alanna Mejia RN  8/4/2017  10:20 AM

## 2017-08-05 VITALS
SYSTOLIC BLOOD PRESSURE: 132 MMHG | HEART RATE: 89 BPM | WEIGHT: 146.61 LBS | RESPIRATION RATE: 15 BRPM | BODY MASS INDEX: 28.78 KG/M2 | OXYGEN SATURATION: 97 % | DIASTOLIC BLOOD PRESSURE: 86 MMHG | TEMPERATURE: 98.6 F | HEIGHT: 60 IN

## 2017-08-05 LAB
ALBUMIN SERPL BCP-MCNC: 2.7 G/DL (ref 3.5–5)
ALBUMIN/GLOB SERPL: 0.8 {RATIO} (ref 1.1–2.2)
ALP SERPL-CCNC: 57 U/L (ref 45–117)
ALT SERPL-CCNC: 11 U/L (ref 12–78)
ANION GAP BLD CALC-SCNC: 7 MMOL/L (ref 5–15)
AST SERPL W P-5'-P-CCNC: 15 U/L (ref 15–37)
BILIRUB SERPL-MCNC: 0.4 MG/DL (ref 0.2–1)
BUN SERPL-MCNC: 6 MG/DL (ref 6–20)
BUN/CREAT SERPL: 7 (ref 12–20)
CALCIUM SERPL-MCNC: 8.3 MG/DL (ref 8.5–10.1)
CHLORIDE SERPL-SCNC: 103 MMOL/L (ref 97–108)
CO2 SERPL-SCNC: 27 MMOL/L (ref 21–32)
CREAT SERPL-MCNC: 0.83 MG/DL (ref 0.55–1.02)
ERYTHROCYTE [DISTWIDTH] IN BLOOD BY AUTOMATED COUNT: 15.3 % (ref 11.5–14.5)
EST. AVERAGE GLUCOSE BLD GHB EST-MCNC: 131 MG/DL
GLOBULIN SER CALC-MCNC: 3.5 G/DL (ref 2–4)
GLUCOSE BLD STRIP.AUTO-MCNC: 119 MG/DL (ref 65–100)
GLUCOSE BLD STRIP.AUTO-MCNC: 127 MG/DL (ref 65–100)
GLUCOSE SERPL-MCNC: 113 MG/DL (ref 65–100)
HBA1C MFR BLD: 6.2 % (ref 4.2–6.3)
HCT VFR BLD AUTO: 34.4 % (ref 35–47)
HGB BLD-MCNC: 10.8 G/DL (ref 11.5–16)
MCH RBC QN AUTO: 29.2 PG (ref 26–34)
MCHC RBC AUTO-ENTMCNC: 31.4 G/DL (ref 30–36.5)
MCV RBC AUTO: 93 FL (ref 80–99)
PLATELET # BLD AUTO: 191 K/UL (ref 150–400)
POTASSIUM SERPL-SCNC: 3.7 MMOL/L (ref 3.5–5.1)
PROT SERPL-MCNC: 6.2 G/DL (ref 6.4–8.2)
RBC # BLD AUTO: 3.7 M/UL (ref 3.8–5.2)
SERVICE CMNT-IMP: ABNORMAL
SERVICE CMNT-IMP: ABNORMAL
SODIUM SERPL-SCNC: 137 MMOL/L (ref 136–145)
WBC # BLD AUTO: 9.8 K/UL (ref 3.6–11)

## 2017-08-05 PROCEDURE — 85027 COMPLETE CBC AUTOMATED: CPT | Performed by: HOSPITALIST

## 2017-08-05 PROCEDURE — 74011250636 HC RX REV CODE- 250/636: Performed by: STUDENT IN AN ORGANIZED HEALTH CARE EDUCATION/TRAINING PROGRAM

## 2017-08-05 PROCEDURE — 83036 HEMOGLOBIN GLYCOSYLATED A1C: CPT | Performed by: HOSPITALIST

## 2017-08-05 PROCEDURE — 74011000258 HC RX REV CODE- 258: Performed by: EMERGENCY MEDICINE

## 2017-08-05 PROCEDURE — 36415 COLL VENOUS BLD VENIPUNCTURE: CPT | Performed by: HOSPITALIST

## 2017-08-05 PROCEDURE — 74011250637 HC RX REV CODE- 250/637: Performed by: STUDENT IN AN ORGANIZED HEALTH CARE EDUCATION/TRAINING PROGRAM

## 2017-08-05 PROCEDURE — 82962 GLUCOSE BLOOD TEST: CPT

## 2017-08-05 PROCEDURE — 74011250636 HC RX REV CODE- 250/636: Performed by: EMERGENCY MEDICINE

## 2017-08-05 PROCEDURE — 80053 COMPREHEN METABOLIC PANEL: CPT | Performed by: HOSPITALIST

## 2017-08-05 RX ORDER — BUMETANIDE 2 MG/1
2 TABLET ORAL DAILY
Qty: 30 TAB | Refills: 0 | Status: SHIPPED
Start: 2017-08-05 | End: 2018-07-30

## 2017-08-05 RX ADMIN — MEMANTINE 10 MG: 5 TABLET ORAL at 09:40

## 2017-08-05 RX ADMIN — CEFTRIAXONE 1 G: 1 INJECTION, POWDER, FOR SOLUTION INTRAMUSCULAR; INTRAVENOUS at 09:40

## 2017-08-05 RX ADMIN — ENOXAPARIN SODIUM 30 MG: 30 INJECTION SUBCUTANEOUS at 14:00

## 2017-08-05 RX ADMIN — MULTIPLE VITAMINS W/ MINERALS TAB 1 TABLET: TAB at 09:40

## 2017-08-05 RX ADMIN — PANTOPRAZOLE SODIUM 40 MG: 40 TABLET, DELAYED RELEASE ORAL at 06:58

## 2017-08-05 RX ADMIN — LEVOTHYROXINE SODIUM 50 MCG: 50 TABLET ORAL at 06:58

## 2017-08-05 RX ADMIN — Medication 10 ML: at 06:00

## 2017-08-05 RX ADMIN — SERTRALINE HYDROCHLORIDE 25 MG: 50 TABLET ORAL at 09:39

## 2017-08-05 RX ADMIN — AMLODIPINE BESYLATE 5 MG: 5 TABLET ORAL at 09:39

## 2017-08-05 RX ADMIN — CALCIUM CARBONATE-VITAMIN D TAB 500 MG-200 UNIT 1 TABLET: 500-200 TAB at 09:40

## 2017-08-05 RX ADMIN — FERROUS SULFATE TAB 325 MG (65 MG ELEMENTAL FE) 325 MG: 325 (65 FE) TAB at 09:40

## 2017-08-05 RX ADMIN — DOXAZOSIN 1 MG: 2 TABLET ORAL at 09:40

## 2017-08-05 NOTE — PROGRESS NOTES
Hospitalist Progress Note  Levi Swan MD  Office: [phone]  Cell: [phone]      Date of Service:  2017  NAME:  Dontrell Mccann  :  1920  MRN:  [id number]      Admission Summary:   Dontrell Mccann is a 80 y.o. female with history of dementia, HTN, PVD, osteoporosis, DM-2 who presents with altered mental status and abnormal movements. At baseline, patient is reported to be verbal, but non-sensical in speech, ambulatory without swallow issues. Per reports, patient had generally been in her usual state of health until ~1-2 weeks ago when she had started having more frequent falls. She was found by her daughter having twitching movements of her body with inattentiveness (at baseline she is able to look and focus), which is different than her baseline. This had prompted her daughter to bring her to the hospital for further evaluation. There had been a previous incident with altered mental status years ago where she had been diagnosed with urinary tract infection. She was noted to be borderline hypothermic with rectal temperature 95.7 F upon admission. She was noted to have seizure-like activity by ED physician and was given Ativan 1 mg x 1.       Interval history / Subjective:   No seizure since admission      Assessment & Plan: Altered mental status possible metabolic encephalopathy POA  -patient is conscious and alert, follow simple command  -CT head show atrophy, significant small vessel ischemic changes, no acute abnormality.   -MRI brain no acute finding  -urine cx positive for gram negative rods, blood cx no growth so far,   -TSH normal  -may d/c zaldivar in am 8/5  -seen by neurologist    Convulsion unclear etiology possible due to UTI  -on prn ativan  -EEG non specific  -unlikely seizure per neurologist  -seen by neurologist     UTI POA  -on ceftriaxone and IVF, will switch to po abx on discharge  -urine cx grow e coli sensitive to ceftriaxone  -blood cx no growth so far    Lactic acidosis multifactorial  -resolved wit IVF    HTN  -BP normal, continue norvasc, cardura and monitor BP    DM-II  -continue sliding scale and monitor finger stick glucose   -check A1c  -home glimepiride is held    Hypothyroidism   -continue synthroid  -TSH normal    Dementia  -continue namenda and aricept  -non verbal at base line per report  -continue supportive care      Code status: DNR  DVT prophylaxis: Lovenox    Care Plan discussed with: Patient/Family, Nurse and   Disposition: Patient from [address] [facility], SNF  I called her daughter, Kristian Sigala, at 12 46 and updated about the discharge plan, questions answered, per her daughter Shant[address] [facility] don't accept on weekend, will check with [facility] Problems  Never Reviewed          Codes Class Noted POA    * (Principal)Seizure [facility]) ICD-10-CM: R56.9  ICD-9-CM: 780.39  8/2/2017 Yes        UTI (urinary tract infection) ICD-10-CM: N39.0  ICD-9-CM: 599.0  8/2/2017 Yes        Dementia (Chronic) ICD-10-CM: F03.90  ICD-9-CM: 294.20  8/2/2017 Yes        Hypothyroidism (Chronic) ICD-10-CM: E03.9  ICD-9-CM: 244.9  8/2/2017 Yes        HTN (hypertension) (Chronic) ICD-10-CM: I10  ICD-9-CM: 401.9  8/2/2017 Yes        PVD (peripheral vascular disease) ([facility] (Chronic) ICD-10-CM: I73.9  ICD-9-CM: 443.9  8/2/2017 Unknown              Vital Signs:    Last 24hrs VS reviewed since prior progress note.  Most recent are:  Visit Vitals    /86 (BP 1 Location: Left arm, BP Patient Position: At rest)    Pulse 89    Temp 98.6 °F (37 °C)    Resp 15    Ht 5' (1.524 m)    Wt 66.5 kg (146 lb 9.7 oz)    SpO2 97%    BMI 28.63 kg/m2         Intake/Output Summary (Last 24 hours) at 08/05/17 1049  Last data filed at 08/05/17 0654   Gross per 24 hour   Intake              100 ml   Output             2000 ml   Net            -1900 ml        Physical Examination: Constitutional:  No acute distress, cooperative, pleasant    ENT:  Oral mucous moist, oropharynx benign. Neck supple,    Resp:  Decrease bronchial breath sound bilaterally. No wheezing/rhonchi/rales. No accessory muscle use   CV:  Regular rhythm, normal rate, no murmurs, gallops, rubs    GI:  Soft, non distended, non tender. normoactive bowel sounds, no hepatosplenomegaly     Musculoskeletal:  No edema    Neurologic:  Conscious and alert, follows simple command     Psych: Not anxious nor agitated. Data Review:    Review and/or order of clinical lab test      Labs:     Recent Labs      08/05/17 0357 08/03/17 0446   WBC  9.8  6.5   HGB  10.8*  9.2*   HCT  34.4*  29.2*   PLT  191  179     Recent Labs      08/05/17 0357 08/03/17 0446   NA  137  142   K  3.7  3.5   CL  103  108   CO2  27  30   BUN  6  12   CREA  0.83  0.88   GLU  113*  90   CA  8.3*  7.8*   MG   --   2.0     Recent Labs      08/05/17 0357   SGOT  15   ALT  11*   AP  57   TBILI  0.4   TP  6.2*   ALB  2.7*   GLOB  3.5     No results for input(s): INR, PTP, APTT in the last 72 hours. No lab exists for component: INREXT, INREXT   No results for input(s): FE, TIBC, PSAT, FERR in the last 72 hours. No results found for: FOL, RBCF   No results for input(s): PH, PCO2, PO2 in the last 72 hours. No results for input(s): CPK, CKNDX, TROIQ in the last 72 hours.     No lab exists for component: CPKMB  No results found for: CHOL, CHOLX, CHLST, CHOLV, HDL, LDL, LDLC, DLDLP, TGLX, TRIGL, TRIGP, CHHD, CHHDX  Lab Results   Component Value Date/Time    Glucose (POC) 119 08/05/2017 09:03 AM    Glucose (POC) 131 08/04/2017 11:53 PM    Glucose (POC) 114 08/04/2017 09:10 PM    Glucose (POC) 126 08/04/2017 05:11 PM    Glucose (POC) 118 08/04/2017 12:17 PM     Lab Results   Component Value Date/Time    Color YELLOW/STRAW 08/02/2017 09:21 AM    Appearance TURBID 08/02/2017 09:21 AM    Specific gravity 1.018 08/02/2017 09:21 AM    pH (UA) 5.5 08/02/2017 09:21 AM    Protein 30 08/02/2017 09:21 AM    Glucose NEGATIVE  08/02/2017 09:21 AM    Ketone NEGATIVE  08/02/2017 09:21 AM    Bilirubin NEGATIVE  08/02/2017 09:21 AM    Urobilinogen 0.2 08/02/2017 09:21 AM    Nitrites NEGATIVE  08/02/2017 09:21 AM    Leukocyte Esterase LARGE 08/02/2017 09:21 AM    Epithelial cells FEW 08/02/2017 09:21 AM    Bacteria 4+ 08/02/2017 09:21 AM    WBC >100 08/02/2017 09:21 AM    RBC 0-5 08/02/2017 09:21 AM         Medications Reviewed:     Current Facility-Administered Medications   Medication Dose Route Frequency    cefTRIAXone (ROCEPHIN) 1 g in 0.9% sodium chloride (MBP/ADV) 50 mL  1 g IntraVENous Q24H    acetaminophen (TYLENOL) tablet 650 mg  650 mg Oral Q6H PRN    amLODIPine (NORVASC) tablet 5 mg  5 mg Oral DAILY    calcium-vitamin D (OS-KEE) 500 mg-200 unit tablet  1 Tab Oral DAILY    ferrous sulfate tablet 325 mg  325 mg Oral DAILY    levothyroxine (SYNTHROID) tablet 50 mcg  50 mcg Oral ACB    multivitamin, tx-iron-ca-min (THERA-M w/ IRON) tablet 1 Tab  1 Tab Oral DAILY    pantoprazole (PROTONIX) tablet 40 mg  40 mg Oral ACB    sertraline (ZOLOFT) tablet 25 mg  25 mg Oral DAILY    sodium chloride (NS) flush 5-10 mL  5-10 mL IntraVENous Q8H    sodium chloride (NS) flush 5-10 mL  5-10 mL IntraVENous PRN    insulin lispro (HUMALOG) injection   SubCUTAneous AC&HS    glucose chewable tablet 16 g  4 Tab Oral PRN    glucagon (GLUCAGEN) injection 1 mg  1 mg IntraMUSCular PRN    LORazepam (ATIVAN) injection 1 mg  1 mg IntraVENous PRN    0.9% sodium chloride infusion  50 mL/hr IntraVENous CONTINUOUS    memantine (NAMENDA) tablet 10 mg  10 mg Oral BID    And    donepezil (ARICEPT) tablet 10 mg  10 mg Oral QHS    dextrose 10 % infusion 125-250 mL  125-250 mL IntraVENous PRN    enoxaparin (LOVENOX) injection 30 mg  30 mg SubCUTAneous Q24H    doxazosin (CARDURA) tablet 1 mg  1 mg Oral DAILY ______________________________________________________________________  EXPECTED LENGTH OF STAY: 2d 16h  ACTUAL LENGTH OF STAY:          3                 Levido Beny MD

## 2017-08-05 NOTE — ROUTINE PROCESS
Bedside shift change report given to Leo Murillo RN (oncoming nurse) by Neville Coleman (offgoing nurse). Report included the following information SBAR, Kardex, Intake/Output, Recent Results, Med Rec Status and Cardiac Rhythm NSR with occassional PVCs. 0800 Assessment done; patient repositioned; routine meds given. Intake 25% of breakfast.    1000 Patient repositioned at this time. 1200 Dr Carter Moyer in to see patient- ceftriaxone d/c'd; ceftin PO ordered. [facility].

## 2017-08-05 NOTE — DISCHARGE SUMMARY
Discharge Summary       PATIENT ID: Rohini Otoole  MRN: [id number]   YOB: 1920    DATE OF ADMISSION: 8/2/2017  9:07 AM    DATE OF DISCHARGE: 8/5/2017   PRIMARY CARE PROVIDER: Philip Vargas MD     ATTENDING PHYSICIAN: Jo-Ann Balderas MD  DISCHARGING PROVIDER: Salinas Monteiro MD    To contact this individual call [phone] and ask the  to page. If unavailable ask to be transferred the Adult Hospitalist Department. CONSULTATIONS: IP CONSULT TO HOSPITALIST  IP CONSULT TO NEUROLOGY    PROCEDURES/SURGERIES: * No surgery found *    ADMITTING DIAGNOSES & HOSPITAL COURSE:     Linda Correa is a 80 y. o. female with history of dementia, HTN, PVD, osteoporosis, DM-2 who presents with altered mental status and abnormal movements.  At baseline, patient is reported to be verbal, but non-sensical in speech, ambulatory without swallow issues. Per reports, patient had generally been in her usual state of health until ~1-2 weeks ago when she had started having more frequent falls. Rashaad Burns was found by her daughter having twitching movements of her body with inattentiveness (at baseline she is able to look and focus), which is different than her baseline.  This had prompted her daughter to bring her to the hospital for further evaluation. Guerita Jeffers had been a previous incident with altered mental status years ago where she had been diagnosed with urinary tract infection.    She was noted to be borderline hypothermic with rectal temperature 95.7 F upon admission.  She was noted to have seizure-like activity by ED physician and was given Ativan 1 mg x 1.      Altered mental status likely due to metabolic encephalopathy related to UTI POA  -patient is conscious and alert, follow simple command  -CT head show atrophy, significant small vessel ischemic changes, no acute abnormality.   -MRI brain no acute finding  -urine cx positive for gram negative rods, blood cx no growth so far,   -TSH normal  -seen by neurologist  Convulsion unclear etiology possible due to UTI  -on prn ativan, no seizure since admission  -EEG non specific  -unlikely seizure disorder per neurologist  -seen by neurologist    UTI POA  -on ceftriaxone and IVF, will switch to po cefuroxime 250 mg po q 12 on discharge  -urine cx grow e coli sensitive to ceftriaxone  -blood cx no growth so far   Lactic acidosis multifactorial  -resolved wit IVF   HTN  -BP normal, continue norvasc, cardura and monitor BP   DM-II  -continue sliding scale and monitor finger stick glucose   -6. 2A1c  -resume home glimepiride 2 mg po q daily, closely monitor finger stick glucose q AC, considering her age she is a high risk for hypoglycemia and may consider switch to only sliding scale  Hypothyroidism   -continue synthroid  -TSH normal   Dementia  -continue namenda and aricept  -non verbal at base line per report  -continue supportive care        Code status: DNR  DVT prophylaxis: Lovenox      DISCHARGE DIAGNOSES / PLAN:      Altered mental status likely due to metabolic encephalopathy related to UTI POA  -improved, appears at her base line  Convulsion unclear etiology possible due to UTI  -continue neuro check, avoid hypoglycemia and hydrate her  UTI POA  -cefuroxime 250 mg po q 12 for 7 days with floraQ  Lactic acidosis multifactorial  -resolved   HTN  -continue norvasc, cardura and monitor BP   DM-II  -resume home glimepiride 2 mg po q daily,monitor finger stick glucose closely  Hypothyroidism   -continue synthroid   Dementia  -continue namenda and aricept  -continue supportive care    PENDING TEST RESULTS:   At the time of discharge the following test results are still pending: none     FOLLOW UP APPOINTMENTS:    Follow-up Information     Follow up With Details Comments Contact Info    Delicia Harding MD In one week  [address] [facility]  [address] [facility]  [phone]       ADDITIONAL CARE RECOMMENDATIONS:     DIET: Diabetic Diet    ACTIVITY: Activity as tolerated    WOUND CARE: none    EQUIPMENT needed: none       DISCHARGE MEDICATIONS:  Current Discharge Medication List      START taking these medications    Details   cefUROXime (CEFTIN) 125 mg/5 mL suspension Take 10 mL by mouth every twelve (12) hours for 7 days. Qty: 280 mL, Refills: 0      L. acidoph & paracasei- S therm- Bifido (BRITTNY-Q/RISAQUAD) 8 billion cell cap cap Take 1 Cap by mouth daily. Qty: 10 Cap, Refills: 0         CONTINUE these medications which have CHANGED    Details   bumetanide (BUMEX) 2 mg tablet Take 1 Tab by mouth daily. Qty: 30 Tab, Refills: 0         CONTINUE these medications which have NOT CHANGED    Details   acetaminophen (TYLENOL) 500 mg tablet Take 1,000 mg by mouth two (2) times daily as needed for Pain or Fever. alendronate (FOSAMAX) 70 mg tablet Take 70 mg by mouth every seven (7) days. Tuesdays      amLODIPine (NORVASC) 5 mg tablet Take 5 mg by mouth daily. Calcium-Cholecalciferol, D3, 500 mg(1,250mg) -400 unit tab Take 1 Each by mouth daily. multivitamin, tx-iron-ca-min (THERA-M W/ IRON) 9 mg iron-400 mcg tab tablet Take 1 Tab by mouth daily. doxazosin (CARDURA) 1 mg tablet Take 1 mg by mouth daily. glimepiride (AMARYL) 2 mg tablet Take 2 mg by mouth daily. ferrous sulfate (SLOW FE) 142 mg (45 mg iron) ER tablet Take 142 mg by mouth daily. ketoconazole (NIZORAL) 2 % topical cream Apply 1 Each to affected area two (2) times daily as needed. for flaky areas      potassium chloride SR (KLOR-CON 8) 8 mEq tablet Take 8 mEq by mouth daily. levothyroxine (SYNTHROID) 50 mcg tablet Take 50 mcg by mouth Daily (before breakfast). memantine-donepezil 28-10 mg CSpX Take 1 Cap by mouth nightly. pantoprazole (PROTONIX) 40 mg tablet Take 40 mg by mouth daily. hydrocortisone (ANUSOL-HC) 2.5 % rectal cream Insert 1 Each into rectum nightly. After bowel movement      sertraline (ZOLOFT) 25 mg tablet Take 25 mg by mouth daily. NOTIFY YOUR PHYSICIAN FOR ANY OF THE FOLLOWING:   Fever over 101 degrees for 24 hours. Chest pain, shortness of breath, fever, chills, nausea, vomiting, diarrhea, change in mentation, falling, weakness, bleeding. Severe pain or pain not relieved by medications. Or, any other signs or symptoms that you may have questions about.     DISPOSITION:    Home With:   OT  PT  HH  RN      x Long term SNF/Inpatient Rehab    Independent/assisted living    Hospice    Other:       PATIENT CONDITION AT DISCHARGE:     Functional status   x Poor     Deconditioned     Independent      Cognition     Lucid     Forgetful    x Dementia      Catheters/lines (plus indication)    Pope     PICC     PEG    x None      Code status     Full code    x DNR      PHYSICAL EXAMINATION AT DISCHARGE:   Refer to Progress Note      CHRONIC MEDICAL DIAGNOSES:  Problem List as of 8/5/2017  Never Reviewed          Codes Class Noted - Resolved    * (Principal)Seizure ([facility] ICD-10-CM: R56.9  ICD-9-CM: 780.39  8/2/2017 - Present        UTI (urinary tract infection) ICD-10-CM: N39.0  ICD-9-CM: 599.0  8/2/2017 - Present        Dementia (Chronic) ICD-10-CM: F03.90  ICD-9-CM: 294.20  8/2/2017 - Present        Hypothyroidism (Chronic) ICD-10-CM: E03.9  ICD-9-CM: 244.9  8/2/2017 - Present        HTN (hypertension) (Chronic) ICD-10-CM: I10  ICD-9-CM: 401.9  8/2/2017 - Present        PVD (peripheral vascular disease) ([facility] (Chronic) ICD-10-CM: I73.9  ICD-9-CM: 443.9  8/2/2017 - Present              Greater than 25 minutes were spent with the patient on counseling and coordination of care    Signed:   Jose De Jesus Morales MD  8/5/2017  12:21 PM

## 2017-08-05 NOTE — ROUTINE PROCESS
Bedside shift change report given to Coty Feliciano RN (oncoming nurse) by Nils Cohen RN (offgoing nurse). Report included the following information SBAR, Intake/Output, MAR, Recent Results and Cardiac Rhythm NSR, 1ºAVB with frequent PVCs.

## 2017-08-05 NOTE — DISCHARGE INSTRUCTIONS
Discharge SNF/Rehab Instructions/LTAC       PATIENT ID: Rohini Otoole  MRN: [id number]   YOB: 1920    DATE OF ADMISSION: 8/2/2017  9:07 AM    DATE OF DISCHARGE: 8/5/2017    PRIMARY CARE PROVIDER: Philip Vargas MD       ATTENDING PHYSICIAN: Salinas Monteiro MD  DISCHARGING PROVIDER: Salinas Monteiro MD     To contact this individual call [phone] and ask the  to page. If unavailable ask to be transferred the Adult Hospitalist Department. CONSULTATIONS: IP CONSULT TO HOSPITALIST  IP CONSULT TO NEUROLOGY    PROCEDURES/SURGERIES: * No surgery found *    ADMITTING DIAGNOSES & HOSPITAL COURSE:   Linda Correa is a 80 y. o. female with history of dementia, HTN, PVD, osteoporosis, DM-2 who presents with altered mental status and abnormal movements.  At baseline, patient is reported to be verbal, but non-sensical in speech, ambulatory without swallow issues. Per reports, patient had generally been in her usual state of health until ~1-2 weeks ago when she had started having more frequent falls. Rashaad Burns was found by her daughter having twitching movements of her body with inattentiveness (at baseline she is able to look and focus), which is different than her baseline.  This had prompted her daughter to bring her to the hospital for further evaluation. Guerita Jeffers had been a previous incident with altered mental status years ago where she had been diagnosed with urinary tract infection.    She was noted to be borderline hypothermic with rectal temperature 95.7 F upon admission.  She was noted to have seizure-like activity by ED physician and was given Ativan 1 mg x 1.      Altered mental status likely due to metabolic encephalopathy related to UTI POA  -patient is conscious and alert, follow simple command  -CT head show atrophy, significant small vessel ischemic changes, no acute abnormality.   -MRI brain no acute finding  -urine cx positive for gram negative rods, blood cx no growth so far,   -TSH normal  -seen by neurologist  Convulsion unclear etiology possible due to UTI  -on prn ativan, no seizure since admission  -EEG non specific  -unlikely seizure disorder per neurologist  -seen by neurologist    UTI POA  -on ceftriaxone and IVF, will switch to po cefuroxime 250 mg po q 12 on discharge  -urine cx grow e coli sensitive to ceftriaxone  -blood cx no growth so far   Lactic acidosis multifactorial  -resolved wit IVF   HTN  -BP normal, continue norvasc, cardura and monitor BP   DM-II  -continue sliding scale and monitor finger stick glucose   -6. 2A1c  -resume home glimepiride 2 mg po q daily, closely monitor finger stick glucose q AC, considering her age she is a high risk for hypoglycemia and may consider switch to only sliding scale  Hypothyroidism   -continue synthroid  -TSH normal   Dementia  -continue namenda and aricept  -non verbal at base line per report  -continue supportive care        Code status: DNR  DVT prophylaxis: Lovenox      DISCHARGE DIAGNOSES / PLAN:      Altered mental status likely due to metabolic encephalopathy related to UTI POA  -improved, appears at her base line  Convulsion unclear etiology possible due to UTI  -continue neuro check, avoid hypoglycemia and hydrate her  UTI POA  -cefuroxime 250 mg po q 12 for 7 days with floraQ  Lactic acidosis multifactorial  -resolved   HTN  -continue norvasc, cardura and monitor BP   DM-II  -resume home glimepiride 2 mg po q daily,monitor finger stick glucose closely  Hypothyroidism   -continue synthroid   Dementia  -continue namenda and aricept  -continue supportive care        PENDING TEST RESULTS:   At the time of discharge the following test results are still pending: none    FOLLOW UP APPOINTMENTS:    Follow-up Information     Follow up With Details Comments Contact Info    Navarro Arroyo MD In one week  [address] [facility]  [address] [place]  [phone]             ADDITIONAL CARE RECOMMENDATIONS:     DIET: Diabetic Diet    TUBE FEEDING INSTRUCTIONS: none    OXYGEN / BiPAP SETTINGS: none     ACTIVITY: Activity as tolerated    WOUND CARE: none     EQUIPMENT needed: none      DISCHARGE MEDICATIONS:   See Medication Reconciliation Form      NOTIFY YOUR PHYSICIAN FOR ANY OF THE FOLLOWING:   Fever over 101 degrees for 24 hours. Chest pain, shortness of breath, fever, chills, nausea, vomiting, diarrhea, change in mentation, falling, weakness, bleeding. Severe pain or pain not relieved by medications. Or, any other signs or symptoms that you may have questions about.     DISPOSITION:    Home With:   OT  PT  HH  RN      x SNF/Inpatient Rehab/LTAC    Independent/assisted living    Hospice    Other:       PATIENT CONDITION AT DISCHARGE:     Functional status   x Poor     Deconditioned     Independent      Cognition     Lucid     Forgetful    x Dementia      Catheters/lines (plus indication)    Pope     PICC     PEG    x None      Code status     Full code    x DNR      PHYSICAL EXAMINATION AT DISCHARGE:   Refer to Progress Note      CHRONIC MEDICAL DIAGNOSES:  Problem List as of 8/5/2017  Never Reviewed          Codes Class Noted - Resolved    * (Principal)Seizure ([email or web address] ICD-10-CM: R56.9  ICD-9-CM: 780.39  8/2/2017 - Present        UTI (urinary tract infection) ICD-10-CM: N39.0  ICD-9-CM: 599.0  8/2/2017 - Present        Dementia (Chronic) ICD-10-CM: F03.90  ICD-9-CM: 294.20  8/2/2017 - Present        Hypothyroidism (Chronic) ICD-10-CM: E03.9  ICD-9-CM: 244.9  8/2/2017 - Present        HTN (hypertension) (Chronic) ICD-10-CM: I10  ICD-9-CM: 401.9  8/2/2017 - Present        PVD (peripheral vascular disease) ([facility] [[email or web address] (Chronic) ICD-10-CM: I73.9  ICD-9-CM: 443.9  8/2/2017 - Present                CDMP Checked:   Yes x     PROBLEM LIST Updated:  Yes x         Signed:   Aaron Mattson MD  8/5/2017  12:11 PM

## 2017-08-05 NOTE — PROGRESS NOTES
Spoke with [facility]:  [phone]. Patient may return to the facility today - Saturday 8/5/17. REPORT MAY BE CALLED TO:  [phone], AND ASK FOR LAURA (nurse who will be taking care of Mrs. Renaldo Avitia)    Patient will need ambulance transport. Will reach out to daughter with an update on coordination of d/c plan for weekend readmit to [facility]. (Have left a voice mail for [email or web address] at [phone] regarding patient's transfer)    Gricelda Rees RN    Update:  Spoke with patient's son Rajan Kumar at [phone] who plans to update his sister. Requested [facility] stretcher transport for 3pm.    Clinical packet/envelope started.

## 2017-08-07 LAB
BACTERIA SPEC CULT: NORMAL
SERVICE CMNT-IMP: NORMAL

## 2018-07-26 ENCOUNTER — APPOINTMENT (OUTPATIENT)
Dept: CT IMAGING | Age: 83
DRG: 189 | End: 2018-07-26
Attending: EMERGENCY MEDICINE
Payer: MEDICARE

## 2018-07-26 ENCOUNTER — HOSPITAL ENCOUNTER (INPATIENT)
Age: 83
LOS: 4 days | Discharge: HOSPICE | DRG: 189 | End: 2018-07-30
Attending: EMERGENCY MEDICINE | Admitting: HOSPITALIST
Payer: MEDICARE

## 2018-07-26 ENCOUNTER — APPOINTMENT (OUTPATIENT)
Dept: GENERAL RADIOLOGY | Age: 83
DRG: 189 | End: 2018-07-26
Attending: EMERGENCY MEDICINE
Payer: MEDICARE

## 2018-07-26 DIAGNOSIS — R23.0 CYANOSIS: Primary | ICD-10-CM

## 2018-07-26 DIAGNOSIS — N28.9 ACUTE RENAL INSUFFICIENCY: ICD-10-CM

## 2018-07-26 DIAGNOSIS — T68.XXXA HYPOTHERMIA, INITIAL ENCOUNTER: ICD-10-CM

## 2018-07-26 DIAGNOSIS — E86.0 DEHYDRATION: ICD-10-CM

## 2018-07-26 PROBLEM — E16.2 HYPOGLYCEMIA: Status: ACTIVE | Noted: 2018-07-26

## 2018-07-26 LAB
ANION GAP SERPL CALC-SCNC: 8 MMOL/L (ref 5–15)
APPEARANCE UR: CLEAR
APTT PPP: 26.2 SEC (ref 22.1–32)
ARTERIAL PATENCY WRIST A: NO
BACTERIA URNS QL MICRO: NEGATIVE /HPF
BASE EXCESS BLD CALC-SCNC: 12 MMOL/L
BASOPHILS # BLD: 0 K/UL (ref 0–0.1)
BASOPHILS NFR BLD: 0 % (ref 0–1)
BDY SITE: ABNORMAL
BILIRUB UR QL: NEGATIVE
BNP SERPL-MCNC: 257 PG/ML (ref 0–450)
BUN SERPL-MCNC: 48 MG/DL (ref 6–20)
BUN/CREAT SERPL: 25 (ref 12–20)
CALCIUM SERPL-MCNC: 9.2 MG/DL (ref 8.5–10.1)
CHLORIDE SERPL-SCNC: 106 MMOL/L (ref 97–108)
CK SERPL-CCNC: 28 U/L (ref 26–192)
CO2 SERPL-SCNC: 33 MMOL/L (ref 21–32)
COLOR UR: NORMAL
CREAT SERPL-MCNC: 1.89 MG/DL (ref 0.55–1.02)
DIFFERENTIAL METHOD BLD: ABNORMAL
EOSINOPHIL # BLD: 0 K/UL (ref 0–0.4)
EOSINOPHIL NFR BLD: 0 % (ref 0–7)
EPITH CASTS URNS QL MICRO: NORMAL /LPF
ERYTHROCYTE [DISTWIDTH] IN BLOOD BY AUTOMATED COUNT: 17 % (ref 11.5–14.5)
GAS FLOW.O2 O2 DELIVERY SYS: ABNORMAL L/MIN
GAS FLOW.O2 SETTING OXYMISER: 0.5 L/M
GLUCOSE SERPL-MCNC: 149 MG/DL (ref 65–100)
GLUCOSE UR STRIP.AUTO-MCNC: NEGATIVE MG/DL
HCO3 BLD-SCNC: 37.1 MMOL/L (ref 22–26)
HCT VFR BLD AUTO: 37.9 % (ref 35–47)
HGB BLD-MCNC: 11.6 G/DL (ref 11.5–16)
HGB UR QL STRIP: NEGATIVE
HYALINE CASTS URNS QL MICRO: NORMAL /LPF (ref 0–5)
IMM GRANULOCYTES # BLD: 0.1 K/UL (ref 0–0.04)
IMM GRANULOCYTES NFR BLD AUTO: 1 % (ref 0–0.5)
INR PPP: 1 (ref 0.9–1.1)
KETONES UR QL STRIP.AUTO: NEGATIVE MG/DL
LACTATE SERPL-SCNC: 2.7 MMOL/L (ref 0.4–2)
LEUKOCYTE ESTERASE UR QL STRIP.AUTO: NEGATIVE
LYMPHOCYTES # BLD: 1.2 K/UL (ref 0.8–3.5)
LYMPHOCYTES NFR BLD: 9 % (ref 12–49)
MAGNESIUM SERPL-MCNC: 2.1 MG/DL (ref 1.6–2.4)
MCH RBC QN AUTO: 30.1 PG (ref 26–34)
MCHC RBC AUTO-ENTMCNC: 30.6 G/DL (ref 30–36.5)
MCV RBC AUTO: 98.4 FL (ref 80–99)
MONOCYTES # BLD: 0.7 K/UL (ref 0–1)
MONOCYTES NFR BLD: 5 % (ref 5–13)
NEUTS SEG # BLD: 11.3 K/UL (ref 1.8–8)
NEUTS SEG NFR BLD: 85 % (ref 32–75)
NITRITE UR QL STRIP.AUTO: NEGATIVE
NRBC # BLD: 0 K/UL (ref 0–0.01)
NRBC BLD-RTO: 0 PER 100 WBC
PCO2 BLD: 62 MMHG (ref 35–45)
PH BLD: 7.38 [PH] (ref 7.35–7.45)
PH UR STRIP: 5 [PH] (ref 5–8)
PLATELET # BLD AUTO: 128 K/UL (ref 150–400)
PMV BLD AUTO: 11.1 FL (ref 8.9–12.9)
PO2 BLD: 85 MMHG (ref 80–100)
POTASSIUM SERPL-SCNC: 4 MMOL/L (ref 3.5–5.1)
PROT UR STRIP-MCNC: NEGATIVE MG/DL
PROTHROMBIN TIME: 10.4 SEC (ref 9–11.1)
RBC # BLD AUTO: 3.85 M/UL (ref 3.8–5.2)
RBC #/AREA URNS HPF: NORMAL /HPF (ref 0–5)
SAO2 % BLD: 96 % (ref 92–97)
SODIUM SERPL-SCNC: 147 MMOL/L (ref 136–145)
SP GR UR REFRACTOMETRY: 1.02 (ref 1–1.03)
SPECIMEN TYPE: ABNORMAL
THERAPEUTIC RANGE,PTTT: NORMAL SECS (ref 58–77)
TROPONIN I SERPL-MCNC: <0.05 NG/ML
UA: UC IF INDICATED,UAUC: NORMAL
UROBILINOGEN UR QL STRIP.AUTO: 0.2 EU/DL (ref 0.2–1)
WBC # BLD AUTO: 13.4 K/UL (ref 3.6–11)
WBC URNS QL MICRO: NORMAL /HPF (ref 0–4)

## 2018-07-26 PROCEDURE — 71045 X-RAY EXAM CHEST 1 VIEW: CPT

## 2018-07-26 PROCEDURE — 65610000006 HC RM INTENSIVE CARE

## 2018-07-26 PROCEDURE — 83605 ASSAY OF LACTIC ACID: CPT | Performed by: EMERGENCY MEDICINE

## 2018-07-26 PROCEDURE — 77030011943

## 2018-07-26 PROCEDURE — 36600 WITHDRAWAL OF ARTERIAL BLOOD: CPT

## 2018-07-26 PROCEDURE — 36415 COLL VENOUS BLD VENIPUNCTURE: CPT | Performed by: EMERGENCY MEDICINE

## 2018-07-26 PROCEDURE — 84484 ASSAY OF TROPONIN QUANT: CPT | Performed by: EMERGENCY MEDICINE

## 2018-07-26 PROCEDURE — 70450 CT HEAD/BRAIN W/O DYE: CPT

## 2018-07-26 PROCEDURE — 94760 N-INVAS EAR/PLS OXIMETRY 1: CPT

## 2018-07-26 PROCEDURE — 85610 PROTHROMBIN TIME: CPT | Performed by: EMERGENCY MEDICINE

## 2018-07-26 PROCEDURE — 77010033678 HC OXYGEN DAILY

## 2018-07-26 PROCEDURE — 85730 THROMBOPLASTIN TIME PARTIAL: CPT | Performed by: EMERGENCY MEDICINE

## 2018-07-26 PROCEDURE — 85025 COMPLETE CBC W/AUTO DIFF WBC: CPT | Performed by: EMERGENCY MEDICINE

## 2018-07-26 PROCEDURE — 80048 BASIC METABOLIC PNL TOTAL CA: CPT | Performed by: EMERGENCY MEDICINE

## 2018-07-26 PROCEDURE — 87040 BLOOD CULTURE FOR BACTERIA: CPT | Performed by: EMERGENCY MEDICINE

## 2018-07-26 PROCEDURE — 82550 ASSAY OF CK (CPK): CPT | Performed by: EMERGENCY MEDICINE

## 2018-07-26 PROCEDURE — 99285 EMERGENCY DEPT VISIT HI MDM: CPT

## 2018-07-26 PROCEDURE — 51701 INSERT BLADDER CATHETER: CPT

## 2018-07-26 PROCEDURE — 82803 BLOOD GASES ANY COMBINATION: CPT

## 2018-07-26 PROCEDURE — 83735 ASSAY OF MAGNESIUM: CPT | Performed by: EMERGENCY MEDICINE

## 2018-07-26 PROCEDURE — 74011250636 HC RX REV CODE- 250/636: Performed by: EMERGENCY MEDICINE

## 2018-07-26 PROCEDURE — 81001 URINALYSIS AUTO W/SCOPE: CPT | Performed by: EMERGENCY MEDICINE

## 2018-07-26 PROCEDURE — 77030013079 HC BLNKT BAIR HGGR 3M -A

## 2018-07-26 PROCEDURE — 93005 ELECTROCARDIOGRAM TRACING: CPT

## 2018-07-26 PROCEDURE — 83880 ASSAY OF NATRIURETIC PEPTIDE: CPT | Performed by: EMERGENCY MEDICINE

## 2018-07-26 PROCEDURE — 74011250636 HC RX REV CODE- 250/636: Performed by: HOSPITALIST

## 2018-07-26 RX ORDER — ALBUTEROL SULFATE 0.83 MG/ML
2.5 SOLUTION RESPIRATORY (INHALATION)
COMMUNITY
End: 2018-07-30

## 2018-07-26 RX ORDER — LEVETIRACETAM 250 MG/1
250 TABLET ORAL 2 TIMES DAILY
COMMUNITY

## 2018-07-26 RX ORDER — GUAIFENESIN 100 MG/5ML
100 SOLUTION ORAL
COMMUNITY

## 2018-07-26 RX ADMIN — SODIUM CHLORIDE 1000 ML: 900 INJECTION, SOLUTION INTRAVENOUS at 19:13

## 2018-07-26 RX ADMIN — SODIUM CHLORIDE 1000 ML: 900 INJECTION, SOLUTION INTRAVENOUS at 18:05

## 2018-07-26 RX ADMIN — SODIUM CHLORIDE 1000 ML: 900 INJECTION, SOLUTION INTRAVENOUS at 20:48

## 2018-07-26 NOTE — IP AVS SNAPSHOT
[address] 
[phone] Patient: David Hawley MRN: [id number] JTO:4/07/2166 About your hospitalization You were admitted on:  July 26, 2018 [facility] on:  July 30, 2018 Why you were hospitalized Your primary diagnosis was:  Hypothermia Follow-up Information Follow up With Details Comments Contact Info [facility] / comfort care Sujit Boykin MD   [address] [facility] [address] 
[phone] Discharge Orders None A check endy indicates which time of day the medication should be taken. My Medications CONTINUE taking these medications Instructions Each Dose to Equal  
 Morning Noon Evening Bedtime  
 acetaminophen 500 mg tablet Commonly known as:  TYLENOL Your last dose was: Your next dose is: Take 1,000 mg by mouth two (2) times daily as needed for Pain or Fever. 1000 mg  
    
   
   
   
  
 guaiFENesin 100 mg/5 mL liquid Commonly known as:  ROBITUSSIN Your last dose was: Your next dose is: Take 100 mg by mouth four (4) times daily as needed for Cough. 100 mg  
    
   
   
   
  
 levETIRAcetam 250 mg tablet Commonly known as:  KEPPRA Your last dose was: Your next dose is: Take 250 mg by mouth two (2) times a day. 250 mg  
    
   
   
   
  
 levothyroxine 50 mcg tablet Commonly known as:  SYNTHROID Your last dose was: Your next dose is: Take 50 mcg by mouth Daily (before breakfast). 50 mcg  
    
   
   
   
  
 pantoprazole 40 mg tablet Commonly known as:  PROTONIX Your last dose was: Your next dose is: Take 40 mg by mouth daily. 40 mg  
    
   
   
   
  
 sertraline 25 mg tablet Commonly known as:  ZOLOFT Your last dose was: Your next dose is: Take 25 mg by mouth daily. 25 mg  
    
   
   
   
  
  
STOP taking these medications   
 albuterol 2.5 mg /3 mL (0.083 %) nebulizer solution Commonly known as:  PROVENTIL VENTOLIN  
   
  
 amLODIPine 5 mg tablet Commonly known as:  NORVASC  
   
  
 bumetanide 2 mg tablet Commonly known as:  BUMEX  
   
  
 doxazosin 1 mg tablet Commonly known as:  CARDURA  
   
  
 glimepiride 2 mg tablet Commonly known as:  AMARYL  
   
  
 ketoconazole 2 % topical cream  
Commonly known as:  NIZORAL  
   
  
 KLOR-CON 8 8 mEq tablet Generic drug:  potassium chloride SR  
   
  
 memantine-donepezil 28-10 mg Cspx SLOW  mg (45 mg iron) ER tablet Generic drug:  ferrous sulfate Discharge Instructions None [facility] Announcement We are excited to announce that we are making your provider's discharge notes available to you in [facility]. You will see these notes when they are completed and signed by the physician that discharged you from your recent hospital stay. If you have any questions or concerns about any information you see in [facility], please call the Health Information Department where you were seen or reach out to your Primary Care Provider for more information about your plan of care. Introducing [facility]! [employer] introduces [facility] patient portal. Now you can access parts of your medical record, email your doctor's office, and request medication refills online. 1. In your internet browser, go to [email or web address] 2. Click on the First Time User? Click Here link in the Sign In box. You will see the New Member Sign Up page. 3. Enter your [facility] Access Code exactly as it appears below. You will not need to use this code after youve completed the sign-up process.  If you do not sign up before the expiration date, you must request a new code. · [employer] Access Code: [email or web address] Expires: 10/24/2018  4:28 PM 
 
4. Enter the last four digits of your Social Security Number (xxxx) and Date of Birth (mm/dd/yyyy) as indicated and click Submit. You will be taken to the next sign-up page. 5. Create a LiquidTalk ID. This will be your LiquidTalk login ID and cannot be changed, so think of one that is secure and easy to remember. 6. Create a [employer]Talk password. You can change your password at any time. 7. Enter your Password Reset Question and Answer. This can be used at a later time if you forget your password. 8. Enter your e-mail address. You will receive e-mail notification when new information is available in [address] 9. Click Sign Up. You can now view and download portions of your medical record. 10. Click the Download Summary menu link to download a portable copy of your medical information. If you have questions, please visit the Frequently Asked Questions section of the [employer] website. Remember, [employer]Talk is NOT to be used for urgent needs. For medical emergencies, dial 911. Now available from your iPhone and Android! Introducing Misael Britton As a [employer] patient, I wanted to make you aware of our electronic visit tool called Misael Britton. [employer] 24/7 allows you to connect within minutes with a medical provider 24 hours a day, seven days a week via a mobile device or tablet or logging into a secure website from your computer. You can access Misael Britton from anywhere in the [place].  
 
A virtual visit might be right for you when you have a simple condition and feel like you just dont want to get out of bed, or cant get away from work for an appointment, when your regular [employer] provider is not available (evenings, weekends or holidays), or when youre out of town and need minor care. Electronic visits cost only $49 and if the [employer] 24/7 provider determines a prescription is needed to treat your condition, one can be electronically transmitted to a nearby pharmacy*. Please take a moment to enroll today if you have not already done so. The enrollment process is free and takes just a few minutes. To enroll, please download the Hashdoc max to your tablet or phone, or visit [email or web address] to enroll on your computer. And, as an [address] [place] [address] patient with a [employer] account, the results of your visits will be scanned into your electronic medical record and your primary care provider will be able to view the scanned results. We urge you to continue to see your regular [employer] [facility] provider for your ongoing medical care. And while your primary care provider may not be the one available when you seek a [employer] virtual visit, the peace of mind you get from getting a real diagnosis real time can be priceless. For more information on [employer], view our Frequently Asked Questions (FAQs) at [email or web address]. Sincerely, 
 
Ana Bailey MD 
Chief Medical Officer [employer] *:  certain medications cannot be prescribed via [employer] Unresulted Labs-Please follow up with your PCP about these lab tests Order Current Status CULTURE, BLOOD, PAIRED Preliminary result Providers Seen During Your Hospitalization Provider Specialty Primary office phone July Del Valle MD Emergency Medicine [phone] Willam Primrose, MD Internal Medicine [phone] Jesus Hughes MD Internal Medicine [phone] Uri Raman MD Hospitalist [phone] Your Primary Care Physician (PCP) Primary Care Physician Office Phone Office Fax Tico Moss [phone] You are allergic to the following No active allergies Recent Documentation Height Weight BMI OB Status Smoking Status 1.524 m 69.4 kg 29.88 kg/m2 Postmenopausal Never Assessed Emergency Contacts Name Discharge Info Relation Home Work Mobile [place] 45 CAREGIVER [3] Daughter [21] [phone] Thanh Correa  Son [22] [phone] Nathaniel Correa DISCHARGE CAREGIVER [3] Son [22] [phone] Patient Belongings The following personal items are in your possession at time of discharge: 
  Dental Appliances: None  Visual Aid: None      Home Medications: None   Jewelry: None  Clothing: Other (comment)    Other Valuables: None Please provide this summary of care documentation to your next provider. Signatures-by signing, you are acknowledging that this After Visit Summary has been reviewed with you and you have received a copy. Patient Signature:  ____________________________________________________________ Date:  ____________________________________________________________  
  
[facility] Provider Signature:  ____________________________________________________________ Date:  ____________________________________________________________

## 2018-07-26 NOTE — PROGRESS NOTES
Admission Medication Reconciliation:    Information obtained from: Medication records    Significant PMH/Disease States:   Past Medical History:   Diagnosis Date    Age related osteoporosis     Anemia     CKD (chronic kidney disease)     Conduct disorder     Delusional disorder (HCC)     Dementia     Dizziness and giddiness     DM2 (diabetes mellitus, type 2) ([facility] [address]     Essential hypertension     GERD (gastroesophageal reflux disease)     Hypothyroid     Major depressive disorder     Peripheral vascular disease (HCC)     Restlessness and agitation     Urge incontinence     Vertigo        Chief Complaint for this Admission:  Hypoxia    Allergies:  Review of patient's allergies indicates no known allergies. Prior to Admission Medications:   Prior to Admission Medications   Prescriptions Last Dose Informant Patient Reported? Taking?   acetaminophen (TYLENOL) 500 mg tablet   Yes Yes   Sig: Take 1,000 mg by mouth two (2) times daily as needed for Pain or Fever. albuterol (PROVENTIL VENTOLIN) 2.5 mg /3 mL (0.083 %) nebulizer solution   Yes Yes   Si.5 mg by Nebulization route every six (6) hours as needed for Wheezing. amLODIPine (NORVASC) 5 mg tablet   Yes Yes   Sig: Take 5 mg by mouth daily. bumetanide (BUMEX) 2 mg tablet   No Yes   Sig: Take 1 Tab by mouth daily. doxazosin (CARDURA) 1 mg tablet   Yes Yes   Sig: Take 1 mg by mouth daily. ferrous sulfate (SLOW FE) 142 mg (45 mg iron) ER tablet   Yes Yes   Sig: Take 142 mg by mouth daily. glimepiride (AMARYL) 2 mg tablet   Yes Yes   Sig: Take 2 mg by mouth daily. guaiFENesin (ROBITUSSIN) 100 mg/5 mL liquid   Yes Yes   Sig: Take 100 mg by mouth four (4) times daily as needed for Cough. ketoconazole (NIZORAL) 2 % topical cream   Yes Yes   Sig: Apply 1 Each to affected area two (2) times daily as needed. for flaky areas   levETIRAcetam (KEPPRA) 250 mg tablet   Yes Yes   Sig: Take 250 mg by mouth two (2) times a day.    levothyroxine (SYNTHROID) 50 mcg tablet   Yes Yes   Sig: Take 50 mcg by mouth Daily (before breakfast). memantine-donepezil 28-10 mg CSpX   Yes Yes   Sig: Take 1 Cap by mouth nightly. pantoprazole (PROTONIX) 40 mg tablet   Yes Yes   Sig: Take 40 mg by mouth daily. potassium chloride SR (KLOR-CON 8) 8 mEq tablet   Yes Yes   Sig: Take 8 mEq by mouth daily. sertraline (ZOLOFT) 25 mg tablet   Yes Yes   Sig: Take 25 mg by mouth daily. Facility-Administered Medications: None         Comments/Recommendations: Med rec completed after review of medication list from SNF.       Add:  Levetiracetam, Guaifenesin, Albuterol    Remove:  Alendronate, Calcium, Hydrocortisone, Probiotic, MVI    Talia Singleton, DaphnieD

## 2018-07-26 NOTE — IP AVS SNAPSHOT
[address] 
[phone] Patient: Heidi Marley MRN: [id number] LAQ:[birth year] A check endy indicates which time of day the medication should be taken. My Medications CONTINUE taking these medications Instructions Each Dose to Equal  
 Morning Noon Evening Bedtime  
 acetaminophen 500 mg tablet Commonly known as:  TYLENOL Your last dose was: Your next dose is: Take 1,000 mg by mouth two (2) times daily as needed for Pain or Fever. 1000 mg  
    
   
   
   
  
 guaiFENesin 100 mg/5 mL liquid Commonly known as:  ROBITUSSIN Your last dose was: Your next dose is: Take 100 mg by mouth four (4) times daily as needed for Cough. 100 mg  
    
   
   
   
  
 levETIRAcetam 250 mg tablet Commonly known as:  KEPPRA Your last dose was: Your next dose is: Take 250 mg by mouth two (2) times a day. 250 mg  
    
   
   
   
  
 levothyroxine 50 mcg tablet Commonly known as:  SYNTHROID Your last dose was: Your next dose is: Take 50 mcg by mouth Daily (before breakfast). 50 mcg  
    
   
   
   
  
 pantoprazole 40 mg tablet Commonly known as:  PROTONIX Your last dose was: Your next dose is: Take 40 mg by mouth daily. 40 mg  
    
   
   
   
  
 sertraline 25 mg tablet Commonly known as:  ZOLOFT Your last dose was: Your next dose is: Take 25 mg by mouth daily. 25 mg  
    
   
   
   
  
  
STOP taking these medications   
 albuterol 2.5 mg /3 mL (0.083 %) nebulizer solution Commonly known as:  PROVENTIL VENTOLIN  
   
  
 amLODIPine 5 mg tablet Commonly known as:  NORVASC  
   
  
 bumetanide 2 mg tablet Commonly known as:  BUMEX  
   
  
 doxazosin 1 mg tablet Commonly known as:  CARDURA  
   
  
 glimepiride 2 mg tablet Commonly known as:  AMARYL  
   
  
 ketoconazole 2 % topical cream  
Commonly known as:  NIZORAL  
   
  
 KLOR-CON 8 8 mEq tablet Generic drug:  potassium chloride SR  
   
  
 memantine-donepezil 28-10 mg Cspx SLOW  mg (45 mg iron) ER tablet Generic drug:  ferrous sulfate

## 2018-07-26 NOTE — ED TRIAGE NOTES
Found this am by staff at [facility] after breakfast, \"cyanotic\". They got her oxygen saturation up with 4L of oxygen. They believed she had aspirated. Sent now by staff for evaluation. She is unable to answer any questions. Sats by EMS 95% on 4L nasal cannula.

## 2018-07-26 NOTE — ED PROVIDER NOTES
HPI Comments: 80 y.o. female with past medical history significant for CKD, diabetes, and dementia who presents from [address] via EMS for evaluation after being found cyanotic this morning. Per EMS, staff reported the pt was found cyanotic after breakfast. Per EMS, staff believed the pt may have aspirated. Per EMS, staff reported the pt is not usually on O2, but was placed on 4L of O2. Per EMS, pt was O2 saturation was 95% on 4 L via NC when they arrived on the scene. Full history, physical exam, and ROS unable to be obtained due to:  dementia. There are no other acute medical concerns at this time. PCP: Fawn Perez MD    Note written by Chauncey De Santiago, as dictated by Tasneem Renteria MD 4:45 PM.        The history is provided by the EMS personnel and the nursing home. The history is limited by the condition of the patient. No  was used. Past Medical History:   Diagnosis Date    Age related osteoporosis     Anemia     CKD (chronic kidney disease)     Conduct disorder     Delusional disorder ([facility])     Dementia     Dizziness and giddiness     DM2 (diabetes mellitus, type 2) (HCC)     Essential hypertension     GERD (gastroesophageal reflux disease)     Hypothyroid     Major depressive disorder     Peripheral vascular disease ([facility])     Restlessness and agitation     Urge incontinence     Vertigo        No past surgical history on file. No family history on file. Social History     Social History    Marital status:      Spouse name: N/A    Number of children: N/A    Years of education: N/A     Occupational History    Not on file.      Social History Main Topics    Smoking status: Not on file    Smokeless tobacco: Not on file    Alcohol use Not on file    Drug use: Not on file    Sexual activity: Not on file     Other Topics Concern    Not on file     Social History Narrative    No narrative on file ALLERGIES: Review of patient's allergies indicates no known allergies. Review of Systems   Unable to perform ROS: Dementia       There were no vitals filed for this visit. Physical Exam   Constitutional: No distress. Pt is an elderly and frail appearing female. In no acute distress. HENT:   Head: Normocephalic and atraumatic. Eyes: Conjunctivae are normal. Pupils are equal, round, and reactive to light. Neck: Neck supple. No tracheal deviation present. Cardiovascular: Normal rate, regular rhythm and normal heart sounds. Exam reveals no gallop and no friction rub. No murmur heard. Pulmonary/Chest: Effort normal and breath sounds normal. No respiratory distress. She has no wheezes. She has no rales. Pt is on 2 L via nasal canula. Abdominal: Soft. She exhibits no distension and no mass. Musculoskeletal: She exhibits no edema. Neurological:   Pt is non-verbal and not oriented. Difficult to fully evaluate without the pt following commands. Skin: Skin is warm and dry. No rash noted. She is not diaphoretic. No erythema. Nursing note and vitals reviewed. Note written by Chauncey Cordoba, as dictated by Kirit Triplett MD 4:47 PM.    MDM  Number of Diagnoses or Management Options  Acute renal insufficiency:   Cyanosis:   Dehydration:   Hypothermia, initial encounter:   Diagnosis management comments: 81 yo WF presents after a cyanotic episode after eating. Pt now looks well. She has dementia and is not conversive. Will check CXR, labs and reassess. Will give IVF and put on O2 now.        Amount and/or Complexity of Data Reviewed  Clinical lab tests: ordered and reviewed  Tests in the radiology section of CPT®: ordered and reviewed  Tests in the medicine section of CPT®: ordered and reviewed  Discussion of test results with the performing providers: yes  Decide to obtain previous medical records or to obtain history from someone other than the patient: yes  Obtain history from someone other than the patient: yes  Review and summarize past medical records: yes  Discuss the patient with other providers: yes  Independent visualization of images, tracings, or specimens: yes    Risk of Complications, Morbidity, and/or Mortality  Presenting problems: high  Diagnostic procedures: high  Management options: high    Critical Care  Total time providing critical care: 30-74 minutes (Total critical care time spent exclusive of procedures:  30 min)        ED Course       Procedures  ED EKG interpretation:  6:20 PM  Rhythm: normal sinus rhythm; and regular . Rate (approx.): 62; ST/T wave: no ST elevation or depression; first degree AV block    PROGRESS NOTE:  5:32 PM  Blood gas report - pH is normal at 7.38. PCO2: 62, PO2:85, and saturation of 96%. CXR- No acute process    6:19 PM  Pt dehydrated, Creat up to 1.8  Lactate elevated 2.7 likely dehydration    IVF are running    Will admit for further treatment    Chiquita rigoberto in place for low Temp as well    CONSULT NOTE:  6:21 PM Kat Greene MD spoke with Dr. Aniket Min for Hospitalist.  Discussed available diagnostic tests and clinical findings. Dr. Lucien Us will admit the pt.    6:21 PM  Patient is being admitted to the hospital.  The results of their tests and reasons for their admission have been discussed with them and/or available family. They convey agreement and understanding for the need to be admitted and for their admission diagnosis. Consultation will be made now with the inpatient physician for hospitalization. PROGRESS NOTE:  6:31 PM  Discussed with the pt's family. Family states the pt is not as responsive as she usually is. Furthermore, the pt is having intermittent twitching in the arms and shoulders. Will do a CT of the head. The family agrees with doing a CT scan and admitting the pt.

## 2018-07-27 LAB
ALBUMIN SERPL-MCNC: 2.5 G/DL (ref 3.5–5)
ALBUMIN/GLOB SERPL: 0.8 {RATIO} (ref 1.1–2.2)
ALP SERPL-CCNC: 87 U/L (ref 45–117)
ALT SERPL-CCNC: 28 U/L (ref 12–78)
ANION GAP SERPL CALC-SCNC: 6 MMOL/L (ref 5–15)
AST SERPL-CCNC: 36 U/L (ref 15–37)
BASOPHILS # BLD: 0 K/UL (ref 0–0.1)
BASOPHILS NFR BLD: 0 % (ref 0–1)
BILIRUB SERPL-MCNC: 0.2 MG/DL (ref 0.2–1)
BUN SERPL-MCNC: 35 MG/DL (ref 6–20)
BUN/CREAT SERPL: 27 (ref 12–20)
CALCIUM SERPL-MCNC: 7.8 MG/DL (ref 8.5–10.1)
CHLORIDE SERPL-SCNC: 115 MMOL/L (ref 97–108)
CO2 SERPL-SCNC: 32 MMOL/L (ref 21–32)
CREAT SERPL-MCNC: 1.31 MG/DL (ref 0.55–1.02)
DIFFERENTIAL METHOD BLD: ABNORMAL
EOSINOPHIL # BLD: 0.1 K/UL (ref 0–0.4)
EOSINOPHIL NFR BLD: 1 % (ref 0–7)
ERYTHROCYTE [DISTWIDTH] IN BLOOD BY AUTOMATED COUNT: 16.9 % (ref 11.5–14.5)
EST. AVERAGE GLUCOSE BLD GHB EST-MCNC: 137 MG/DL
GLOBULIN SER CALC-MCNC: 3.2 G/DL (ref 2–4)
GLUCOSE BLD STRIP.AUTO-MCNC: 117 MG/DL (ref 65–100)
GLUCOSE BLD STRIP.AUTO-MCNC: 134 MG/DL (ref 65–100)
GLUCOSE BLD STRIP.AUTO-MCNC: 206 MG/DL (ref 65–100)
GLUCOSE BLD STRIP.AUTO-MCNC: 58 MG/DL (ref 65–100)
GLUCOSE BLD STRIP.AUTO-MCNC: 67 MG/DL (ref 65–100)
GLUCOSE BLD STRIP.AUTO-MCNC: 67 MG/DL (ref 65–100)
GLUCOSE BLD STRIP.AUTO-MCNC: 68 MG/DL (ref 65–100)
GLUCOSE BLD STRIP.AUTO-MCNC: 73 MG/DL (ref 65–100)
GLUCOSE BLD STRIP.AUTO-MCNC: 87 MG/DL (ref 65–100)
GLUCOSE SERPL-MCNC: 59 MG/DL (ref 65–100)
HBA1C MFR BLD: 6.4 % (ref 4.2–6.3)
HCT VFR BLD AUTO: 31.7 % (ref 35–47)
HGB BLD-MCNC: 9.5 G/DL (ref 11.5–16)
IMM GRANULOCYTES # BLD: 0.1 K/UL (ref 0–0.04)
IMM GRANULOCYTES NFR BLD AUTO: 1 % (ref 0–0.5)
LACTATE SERPL-SCNC: 1 MMOL/L (ref 0.4–2)
LYMPHOCYTES # BLD: 1.5 K/UL (ref 0.8–3.5)
LYMPHOCYTES NFR BLD: 15 % (ref 12–49)
MAGNESIUM SERPL-MCNC: 1.9 MG/DL (ref 1.6–2.4)
MCH RBC QN AUTO: 29.5 PG (ref 26–34)
MCHC RBC AUTO-ENTMCNC: 30 G/DL (ref 30–36.5)
MCV RBC AUTO: 98.4 FL (ref 80–99)
MONOCYTES # BLD: 0.5 K/UL (ref 0–1)
MONOCYTES NFR BLD: 5 % (ref 5–13)
NEUTS SEG # BLD: 8.1 K/UL (ref 1.8–8)
NEUTS SEG NFR BLD: 79 % (ref 32–75)
NRBC # BLD: 0 K/UL (ref 0–0.01)
NRBC BLD-RTO: 0 PER 100 WBC
PHOSPHATE SERPL-MCNC: 2.8 MG/DL (ref 2.6–4.7)
PLATELET # BLD AUTO: 96 K/UL (ref 150–400)
PMV BLD AUTO: 11 FL (ref 8.9–12.9)
POTASSIUM SERPL-SCNC: 4.1 MMOL/L (ref 3.5–5.1)
PROT SERPL-MCNC: 5.7 G/DL (ref 6.4–8.2)
RBC # BLD AUTO: 3.22 M/UL (ref 3.8–5.2)
SERVICE CMNT-IMP: ABNORMAL
SERVICE CMNT-IMP: NORMAL
SODIUM SERPL-SCNC: 153 MMOL/L (ref 136–145)
WBC # BLD AUTO: 10.3 K/UL (ref 3.6–11)

## 2018-07-27 PROCEDURE — 74011250636 HC RX REV CODE- 250/636: Performed by: HOSPITALIST

## 2018-07-27 PROCEDURE — 94760 N-INVAS EAR/PLS OXIMETRY 1: CPT

## 2018-07-27 PROCEDURE — 85025 COMPLETE CBC W/AUTO DIFF WBC: CPT | Performed by: HOSPITALIST

## 2018-07-27 PROCEDURE — 74011000250 HC RX REV CODE- 250: Performed by: HOSPITALIST

## 2018-07-27 PROCEDURE — 83735 ASSAY OF MAGNESIUM: CPT | Performed by: HOSPITALIST

## 2018-07-27 PROCEDURE — 83036 HEMOGLOBIN GLYCOSYLATED A1C: CPT | Performed by: HOSPITALIST

## 2018-07-27 PROCEDURE — 77010033678 HC OXYGEN DAILY

## 2018-07-27 PROCEDURE — 82962 GLUCOSE BLOOD TEST: CPT

## 2018-07-27 PROCEDURE — 74011000258 HC RX REV CODE- 258: Performed by: INTERNAL MEDICINE

## 2018-07-27 PROCEDURE — 84100 ASSAY OF PHOSPHORUS: CPT | Performed by: HOSPITALIST

## 2018-07-27 PROCEDURE — 80053 COMPREHEN METABOLIC PANEL: CPT | Performed by: HOSPITALIST

## 2018-07-27 PROCEDURE — 36415 COLL VENOUS BLD VENIPUNCTURE: CPT | Performed by: HOSPITALIST

## 2018-07-27 PROCEDURE — 65660000000 HC RM CCU STEPDOWN

## 2018-07-27 PROCEDURE — 94640 AIRWAY INHALATION TREATMENT: CPT

## 2018-07-27 PROCEDURE — 77030029684 HC NEB SM VOL KT MONA -A

## 2018-07-27 PROCEDURE — 83605 ASSAY OF LACTIC ACID: CPT | Performed by: EMERGENCY MEDICINE

## 2018-07-27 PROCEDURE — 74011000258 HC RX REV CODE- 258: Performed by: HOSPITALIST

## 2018-07-27 RX ORDER — PANTOPRAZOLE SODIUM 40 MG/1
40 TABLET, DELAYED RELEASE ORAL
Status: DISCONTINUED | OUTPATIENT
Start: 2018-07-28 | End: 2018-07-30 | Stop reason: HOSPADM

## 2018-07-27 RX ORDER — SODIUM CHLORIDE 0.9 % (FLUSH) 0.9 %
5-10 SYRINGE (ML) INJECTION AS NEEDED
Status: DISCONTINUED | OUTPATIENT
Start: 2018-07-27 | End: 2018-07-30 | Stop reason: HOSPADM

## 2018-07-27 RX ORDER — LEVOTHYROXINE SODIUM 50 UG/1
50 TABLET ORAL
Status: DISCONTINUED | OUTPATIENT
Start: 2018-07-28 | End: 2018-07-30 | Stop reason: HOSPADM

## 2018-07-27 RX ORDER — SERTRALINE HYDROCHLORIDE 50 MG/1
25 TABLET, FILM COATED ORAL DAILY
Status: DISCONTINUED | OUTPATIENT
Start: 2018-07-28 | End: 2018-07-30 | Stop reason: HOSPADM

## 2018-07-27 RX ORDER — DEXTROSE 50 % IN WATER (D50W) INTRAVENOUS SYRINGE
12.5-25 AS NEEDED
Status: DISCONTINUED | OUTPATIENT
Start: 2018-07-27 | End: 2018-07-30 | Stop reason: HOSPADM

## 2018-07-27 RX ORDER — SODIUM CHLORIDE 9 MG/ML
100 INJECTION, SOLUTION INTRAVENOUS CONTINUOUS
Status: DISCONTINUED | OUTPATIENT
Start: 2018-07-27 | End: 2018-07-27

## 2018-07-27 RX ORDER — ALBUTEROL SULFATE 0.83 MG/ML
2.5 SOLUTION RESPIRATORY (INHALATION)
Status: DISCONTINUED | OUTPATIENT
Start: 2018-07-27 | End: 2018-07-29

## 2018-07-27 RX ORDER — INSULIN LISPRO 100 [IU]/ML
INJECTION, SOLUTION INTRAVENOUS; SUBCUTANEOUS
Status: DISCONTINUED | OUTPATIENT
Start: 2018-07-27 | End: 2018-07-30 | Stop reason: HOSPADM

## 2018-07-27 RX ORDER — DEXTROSE MONOHYDRATE AND SODIUM CHLORIDE 5; .9 G/100ML; G/100ML
100 INJECTION, SOLUTION INTRAVENOUS CONTINUOUS
Status: DISCONTINUED | OUTPATIENT
Start: 2018-07-27 | End: 2018-07-30

## 2018-07-27 RX ORDER — MAGNESIUM SULFATE 100 %
4 CRYSTALS MISCELLANEOUS AS NEEDED
Status: DISCONTINUED | OUTPATIENT
Start: 2018-07-27 | End: 2018-07-30 | Stop reason: HOSPADM

## 2018-07-27 RX ORDER — HEPARIN SODIUM 5000 [USP'U]/ML
5000 INJECTION, SOLUTION INTRAVENOUS; SUBCUTANEOUS EVERY 12 HOURS
Status: DISCONTINUED | OUTPATIENT
Start: 2018-07-27 | End: 2018-07-30 | Stop reason: HOSPADM

## 2018-07-27 RX ORDER — SODIUM CHLORIDE 0.9 % (FLUSH) 0.9 %
5-10 SYRINGE (ML) INJECTION EVERY 8 HOURS
Status: DISCONTINUED | OUTPATIENT
Start: 2018-07-27 | End: 2018-07-30 | Stop reason: HOSPADM

## 2018-07-27 RX ADMIN — PIPERACILLIN SODIUM,TAZOBACTAM SODIUM 3.38 G: 3; .375 INJECTION, POWDER, FOR SOLUTION INTRAVENOUS at 16:48

## 2018-07-27 RX ADMIN — DEXTROSE MONOHYDRATE 25 G: 25 INJECTION, SOLUTION INTRAVENOUS at 05:38

## 2018-07-27 RX ADMIN — HEPARIN SODIUM 5000 UNITS: 5000 INJECTION INTRAVENOUS; SUBCUTANEOUS at 17:28

## 2018-07-27 RX ADMIN — ALBUTEROL SULFATE 2.5 MG: 2.5 SOLUTION RESPIRATORY (INHALATION) at 20:55

## 2018-07-27 RX ADMIN — PIPERACILLIN SODIUM AND TAZOBACTAM SODIUM 4.5 G: 4; .5 INJECTION, POWDER, LYOPHILIZED, FOR SOLUTION INTRAVENOUS at 02:53

## 2018-07-27 RX ADMIN — Medication 10 ML: at 06:00

## 2018-07-27 RX ADMIN — ALBUTEROL SULFATE 2.5 MG: 2.5 SOLUTION RESPIRATORY (INHALATION) at 16:11

## 2018-07-27 RX ADMIN — DEXTROSE MONOHYDRATE 25 G: 25 INJECTION, SOLUTION INTRAVENOUS at 07:40

## 2018-07-27 RX ADMIN — Medication 10 ML: at 22:15

## 2018-07-27 RX ADMIN — Medication 10 ML: at 03:04

## 2018-07-27 RX ADMIN — DEXTROSE MONOHYDRATE 25 G: 25 INJECTION, SOLUTION INTRAVENOUS at 11:29

## 2018-07-27 RX ADMIN — PIPERACILLIN SODIUM,TAZOBACTAM SODIUM 3.38 G: 3; .375 INJECTION, POWDER, FOR SOLUTION INTRAVENOUS at 11:21

## 2018-07-27 RX ADMIN — SODIUM CHLORIDE 100 ML/HR: 900 INJECTION, SOLUTION INTRAVENOUS at 01:26

## 2018-07-27 RX ADMIN — HEPARIN SODIUM 5000 UNITS: 5000 INJECTION INTRAVENOUS; SUBCUTANEOUS at 02:53

## 2018-07-27 RX ADMIN — DEXTROSE MONOHYDRATE 25 G: 25 INJECTION, SOLUTION INTRAVENOUS at 16:59

## 2018-07-27 RX ADMIN — DEXTROSE MONOHYDRATE AND SODIUM CHLORIDE 100 ML/HR: 5; .9 INJECTION, SOLUTION INTRAVENOUS at 17:12

## 2018-07-27 NOTE — PROGRESS NOTES
Bedside and Verbal shift change report given to Alexa Bower (oncoming nurse) by Dilan Potts (offgoing nurse). Report included the following information SBAR, Kardex, Intake/Output and MAR.

## 2018-07-27 NOTE — H&P
[address] [facility]  HISTORY AND PHYSICAL      Mery Mota.  MR#: [id number]  : 1920  ACCOUNT #: [de-identified]   ADMIT DATE: 2018    AMENDED DOCUMENT - ADDENDUM ADDED; 2018    CHIEF COMPLAINT:  Found cyanotic this morning at the nursing home. HISTORY OF PRESENT ILLNESS:  History is from the patient's daughter, son and ED note. The patient is unable to provide any history and this is a 80-year-old white female with history of CKD, diabetes, dementia, lives in [facility] long-term. The staff reported the patient was found cyanotic after breakfast.  The staff believe the patient may have aspirated and the patient was put on O2 by EMS and her sats is 94% on 4 liters when she arrived in the ED. In the ED, noted the patient was hypothermic, temperature is 94, so she was put on [employer]. Initially, her blood pressure is 100/90. She is given normal saline bolus but her systolic blood pressure continue going down. At this moment, when I evaluated her, she is around systolic in the 79N and the third boluses is still going at this moment and patient is minimal response, only opens eyes, no conversation. REVIEW OF SYSTEMS:  Unable to obtain due to her condition. She has dementia at the baseline. She may talk to her son with some simple words per daughter. PAST MEDICAL HISTORY:  Dementia and CKD stage III, diabetes type 2, hypertension, hypothyroidism and depression. MEDICATIONS:  Reviewed. Tylenol as needed, Albuterol nebs, Norvasc 5 mg, Bumex 2 mg, Cardura 1 mg daily, Amaryl 2 mg daily, Keppra. ADDENDUM - JOB:  [id number]; 2018     MEDICATIONS:  Also including Keppra 250 twice a day, Synthroid 50 mcg daily, memantine 28 at night, Protonix 40 mg daily, and potassium 1 tab daily, Zoloft 25 mg daily. ALLERGIES:  PATIENT HAS NO DRUG ALLERGY. CODE STATUS:  PATIENT IS DNI/DNR.     FAMILY HISTORY:  Unable to obtain from the patient's son, does not know. SOCIAL HISTORY:  The patient is , living in a nursing home long-term, and no alcohol. LABORATORY DATA:  On admission, WBC 13, H and H 11/36, platelet of 486. UA shows 0-4 WBC. Sodium 147, potassium 4.0, BUN 48, creatinine 1.88. Troponin negative. . Chest x-ray, no acute changes. PHYSICAL EXAMINATION:  GENERAL:  Patient is elderly, frail looking, minimal response. Patient on [employer]. VITAL SIGNS:  Temperature 94.6, heart rate 59, blood pressure 70/50, saturation 98 on 3 liters. HEENT:  No trauma in the head. Pupils 4 mm bilateral and not very reactive to light. Mucosa looks dry. No nose discharge. No ear discharge. NECK:  Supple. No JVD. No carotid bruit. CHEST:  Clear to auscultation and percussion. No wheezing. No tenderness now on palpation and breathing looks normal.  CARDIOVASCULAR:  Regular rate. No murmur. PMI is nondisplaced. ABDOMEN:  Mildly distended, soft, nontender. Bowel sounds positive. EXTREMITIES:  No joint swelling. Passive range of motion looks normal.  No calf tenderness. SKIN:  Warm at this moment on [employer]. Pulses 2+ bilateral.  NEUROLOGIC:  Patient not following commands, only opens eyes. ASSESSMENT AND PLAN:  1. Hypoxia, acute respiratory failure. Could be aspiration, could be PE, and could be other etiology. Patient is on oxygen now and we will empirically start with intravenous Zosyn. Discussed with son and daughter we will not pursue aggressive intervention and if her blood pressure continues to drop even after a bolus of normal saline, we will focus on comfort care. 2.  Hypothermia may be due to sepsis or failure to thrive with her age. At this moment, we will put on [employer] and we will reassess based on her overall blood pressure. 3.  Hypotension. Patient's home blood pressure medicines are on hold.   Could be due to sepsis and patient has already received 3 liters of normal saline bolus and we will see how her blood pressure is. We will not give any pressors. If patient continues to decline, we will transition to comfort care and this is confirmed from son. 4.  Diabetes. Hold oral medicine. May give sliding scales. 5.  History of hypertension. Currently, blood pressure medications are on hold. 6.  Dementia. If the patient wakes up, may have speech evaluation.           MD CICI Ponce/ROXY  D: 07/26/2018 20:56     T: 07/26/2018 21:45  JOB #: [id number]

## 2018-07-27 NOTE — ED NOTES
HYPOGLYCEMIC EPISODE DOCUMENTATION Patient with hypoglycemic episode(s) at 0740(time) on 07/27/18(date). BG value(s) pre-treatment 67 Was patient symptomatic? [] yes, [] no 
Patient was treated with the following rescue medications/treatments: [] D50 [] Glucose tablets 
              [] Glucagon 
              [] 4oz juice 
              [] 6oz reg soda 
              [] 8oz low fat milk BG value post-treatment: Once BG treated and value greater than 80mg/dl, pt was provided with the following: 
[] snack 
[] meal 
Name of MD notified: The following orders were received:

## 2018-07-27 NOTE — ED NOTES
Chiquita franklin removed for temp >96. Pt provided with warm blankets for comfort and continued warmth.   Daughter @ BS,  in for last rites per family's request.

## 2018-07-27 NOTE — CDMP QUERY
Query #2 Dear Dr. Ghassan Felix, Please clarify if this patient is (was) being treated/managed for:  
 
=> Type 2 diabetes mellitus with hypoglycemia without coma in the setting of hypoglycemia requiring medication adjustment, lab/POC monitoring, DTC consult 
=> Other explanation of clinical findings 
=> Clinically Undetermined (no explanation for clinical findings) The medical record reflects the following clinical findings, treatment, and risk factors. Risk Factors:  hx diabetes type 2 Clinical Indicators: POC 50/67,   glucose 59 Treatment: hold oral meds, lab/POC monitoring, D50, DTC consult Please clarify and document your clinical opinion in the progress notes and discharge summary including the definitive and/or presumptive diagnosis, (suspected or probable), related to the above clinical findings. Please include clinical findings supporting your diagnosis. Thank You 
Luis Armando Sanon,MSN,BSN,RN,[facility] 
[phone]

## 2018-07-27 NOTE — ED NOTES
Assumed care of pt. Pt with shallow breathing breathing, opening eyes to voice only. Skin warm to touch. VSS. Daughter at the bed side.

## 2018-07-27 NOTE — DIABETES MGMT
DTC Progress Note Recommendations/ Comments: Chart review for hypoglycemia - BG 59 mg/dl at 0311, then 58 mg/dl at 0534. BG in the 60's this morning. Hx CKD If appropriate, please consider Consider IV containing dextrose Change lispro correction scale to high sensitivity due to renal status Current hospital DM medication: lispro correction scale, normal sesntivity [place] [address] [address]. Patient is a 80 y.o. female with known DM; medications - Amaryl 2 mg daily. Pt resides at [facility]. Found cyanotic and brought to ER. Pt is a DNR A1c:  
Lab Results Component Value Date/Time Hemoglobin A1c 6.4 (H) 07/27/2018 03:11 AM  
 Hemoglobin A1c 6.2 08/05/2017 03:57 AM  
 
 
Recent Glucose Results:  
Lab Results Component Value Date/Time GLU 59 (L) 07/27/2018 03:11 AM  
  (H) 07/26/2018 05:20 PM  
 GLUCPOC 68 07/27/2018 09:09 AM  
 GLUCPOC 67 07/27/2018 07:38 AM  
 GLUCPOC 117 (H) 07/27/2018 05:59 AM  
  
 
Lab Results Component Value Date/Time Creatinine 1.31 (H) 07/27/2018 03:11 AM  
 
Estimated Creatinine Clearance: 21.4 mL/min (based on Cr of 1.31). Active Orders There are no active orders of the following type(s): Diet. PO intake: No data found. Will continue to follow as needed. Thank you Pierre Hartman RN, CDE

## 2018-07-27 NOTE — ED NOTES
Patient had audible wheezing while diaper was being changed. Expiratory wheezing heard on L upper anterior lobe on auscultation.

## 2018-07-27 NOTE — H&P
[address] [facility]  HISTORY AND PHYSICAL      Kesha Heller.  MR#: [id number]  : 1920  ACCOUNT #: [de-identified]   ADMIT DATE: 2018    AMENDED DOCUMEN - ADDENDUM ADDED; 2018    CHIEF COMPLAINT:  Found cyanotic this morning at the nursing home. HISTORY OF PRESENT ILLNESS:  History is from the patient's daughter, son and ED note. The patient is unable to provide any history and this is a 80-year-old white female with history of CKD, diabetes, dementia, lives in [facility] long-term. The staff reported the patient was found cyanotic after breakfast.  The staff believe the patient may have aspirated and the patient was put on O2 by EMS and her sats is 94% on 4 liters when she arrived in the ED. In the ED, noted the patient was hypothermic, temperature is 94, so she was put on [employer]. Initially, her blood pressure is 100/90. She is given normal saline bolus but her systolic blood pressure continue going down. At this moment, when I evaluated her, she is around systolic in the 68B and the third boluses is still going at this moment and patient is minimal response, only opens eyes, no conversation. REVIEW OF SYSTEMS:  Unable to obtain due to her condition. She has dementia at the baseline. She may talk to her son with some simple words per daughter. PAST MEDICAL HISTORY:  Dementia and CKD stage III, diabetes type 2, hypertension, hypothyroidism and depression. MEDICATIONS:  Reviewed. Tylenol as needed, Albuterol nebs, Norvasc 5 mg, Bumex 2 mg, Cardura 1 mg daily, Amaryl 2 mg daily, Keppra. ADDENDUM - JOB:  [id number]     MEDICATIONS:  Also including Keppra 250 twice a day, Synthroid 50 mcg daily, memantine 28 at night, Protonix 40 mg daily, and potassium 1 tab daily, Zoloft 25 mg daily. ALLERGIES:  PATIENT HAS NO DRUG ALLERGY. CODE STATUS:  PATIENT IS DNI/DNR.     FAMILY HISTORY:  Unable to obtain from the patient's son, does not know. SOCIAL HISTORY:  The patient is , living in a nursing home long-term, and no alcohol. LABORATORY DATA:  On admission, WBC 13, H and H 11/36, platelet of 157. UA shows 0-4 WBC. Sodium 147, potassium 4.0, BUN 48, creatinine 1.88. Troponin negative. . Chest x-ray, no acute changes. PHYSICAL EXAMINATION:  GENERAL:  Patient is elderly, frail looking, minimal response. Patient on [employer]. VITAL SIGNS:  Temperature 94.6, heart rate 59, blood pressure 70/50, saturation 98 on 3 liters. HEENT:  No trauma in the head. Pupils 4 mm bilateral and not very reactive to light. Mucosa looks dry. No nose discharge. No ear discharge. NECK:  Supple. No JVD. No carotid bruit. CHEST:  Clear to auscultation and percussion. No wheezing. No tenderness now on palpation and breathing looks normal.  CARDIOVASCULAR:  Regular rate. No murmur. PMI is nondisplaced. ABDOMEN:  Mildly distended, soft, nontender. Bowel sounds positive. EXTREMITIES:  No joint swelling. Passive range of motion looks normal.  No calf tenderness. SKIN:  Warm at this moment on [employer]. Pulses 2+ bilateral.  NEUROLOGIC:  Patient not following commands, only opens eyes. ASSESSMENT AND PLAN:  1. Hypoxia, acute respiratory failure. Could be aspiration, could be PE, and could be other etiology. Patient is on oxygen now and we will empirically start with intravenous Zosyn. Discussed with son and daughter we will not pursue aggressive intervention and if her blood pressure continues to drop even after a bolus of normal saline, we will focus on comfort care. 2.  Hypothermia may be due to sepsis or failure to thrive with her age. At this moment, we will put on [employer] [facility] and we will reassess based on her overall blood pressure. 3.  Hypotension. Patient's home blood pressure medicines are on hold.   Could be due to sepsis and patient has already received 3 liters of normal saline bolus and we will see how her blood pressure is. We will not give any pressors. If patient continues to decline, we will transition to comfort care and this is confirmed from son. 4.  Diabetes. Hold oral medicine. May give sliding scales. 5.  History of hypertension. Currently, blood pressure medications are on hold. 6.  Dementia. If the patient wakes up, may have speech evaluation.           MD CICI Ashley/ROXY  D: 07/26/2018 20:56     T: 07/26/2018 21:45  JOB #: [id number]

## 2018-07-27 NOTE — ED NOTES
Multiple family @ BS. Pt sleeping, arouses to voice, breathing shallowly. No needs by family at this time. Call light in reach.

## 2018-07-27 NOTE — PROGRESS NOTES
Connected with  who is covering for Tono Dilia Hsu from [employer] and escorted him to patient's room. Daughter, Yenifer Alcantaraing, still present at bedside. Provided supportive presence as patient received sacrament and then engaged daughter in life review. Daughter shared that the patient has been a dedicated mother her entire life and that she is looking forward to her being reunited with her . Daughter is grieving appropriately and providing words of comfort and love to the patient. Advised of  availability and assured of ongoing support as needed and desired. yandel Almodovar M.Div.    Paging Service [phone])

## 2018-07-27 NOTE — PROGRESS NOTES
Primary Nurse Dea Yadav RN and Noris Shoulder, RN performed a dual skin assessment on this patient Impairment noted- see wound doc flow sheet Eleuterio score is 13 Patient has a small scab on left 5th toe and a small scab on left elbow. No dressings needed. Cleansed  of small loose stool, diaper changed, no urine noted but did have urine in ED.

## 2018-07-27 NOTE — PROGRESS NOTES
Spiritual Care Assessment/Progress Note  [facility]      NAME: Terrell Metcalf      MRN: [id number]  AGE: 80 y.o. SEX: female  Restorationism Affiliation: Caodaism   Language: English     7/26/2018     Total Time (in minutes): 36     Spiritual Assessment begun in [address] through conversation with:         []Patient        [x] Family    [] Friend(s)        Reason for Consult: Request by family/friend(s)     Spiritual beliefs: (Please include comment if needed)     [x] Identifies with a alice tradition:         [x] Supported by a alice community:            [] Claims no spiritual orientation:           [] Seeking spiritual identity:                [] Adheres to an individual form of spirituality:           [] Not able to assess:                           Identified resources for coping:      [x] Prayer                               [] Music                  [] Guided Imagery     [x] Family/friends                 [] Pet visits     [] Devotional reading                         [] Unknown     [x] Other: Sacraments                                              Interventions offered during this visit: (See comments for more details)          Family/Friend(s):  Affirmation of emotions/emotional suffering, Affirmation of alice, Catharsis/review of pertinent events in supportive environment, Coping skills reviewed/reinforced, End of life issues discussed, Iconic (affirming the presence of God/Higher Power), Coordination with community clergy, Life review/legacy, Normalization of emotional/spiritual concerns, Prayer (assurance of), Restorationism beliefs/image of God discussed     Plan of Care:     [x] Support spiritual and/or cultural needs    [] Support AMD and/or advance care planning process      [x] Support grieving process   [x] Coordinate Rites and/or Rituals    [x] Coordination with community clergy   [] No spiritual needs identified at this time   [] Detailed Plan of Care below (See Comments)  [] Make referral to Music Therapy  [] Make referral to Pet Therapy     [] Make referral to Addiction services  [] Make referral to [facility]  [] Make referral to Spiritual Care Partner  [] No future visits requested        [] Follow up visits as needed     Comments: Responded to call from nursing staff that patient's family was requesting  visit. Met with patient's two sons and daughter at bedside. Patient is not currently able to engage in visit. Family shared their desire for patient to receive Last Rites. Patient currently resides as [facility] and is not active at any particular [facility]. This  reached out to Chyna Leah and left a message and [facility]'s after hours paging system. Provided family with updates as to  availability. GIOVANNI ThomasDiv.    Paging Service [phone])

## 2018-07-27 NOTE — ED NOTES
HYPOGLYCEMIC EPISODE DOCUMENTATION    Patient with hypoglycemic episode(s) at 0534(time) on 07/27/18(date). BG value(s) pre-treatment 62    Was patient symptomatic?  [] yes, [] no  Patient was treated with the following rescue medications/treatments: [x] D50                [] Glucose tablets                [] Glucagon                [] 4oz juice                [] 6oz reg soda                [] 8oz low fat milk  BG value post-treatment: 117  Once BG treated and value greater than 80mg/dl, pt was provided with the following: Pt on aspiration precaution  [] snack  [] meal  Name of MD notified:n/a  The following orders were received: n/a

## 2018-07-27 NOTE — WOUND CARE
Wound Consult:  New Patient Visit. Chart reviewed. Consulted for redness to buttocks/sacrum. Spoke with patients nurse,  Nicanor Lara and we were at bedside to turn and provide care; patient's daughter at bedside. Patient is resting on a total care bed. She is being turned. Assessment: 
Buttocks/sacrum - intact, no areas of redness at this time, has some faintly pink intact skin that blanches. Heels without redness. Treatment: 
Applied aloe vesta protective ointment to buttocks areas. Skin Care Recommendations: 1. Minimize friction/shear: minimize layers of linen/pads under patient. 2. Off load pressure/reposition: continue to turn and reposition approximately every 2 hours; float heels or use Prevalon boots. 3. Manage Moisture - keep skin folds dry; incontinence skin care as needed; appropriate sized briefs if needed, barrier cream. 
4. Continue to monitor nutrition, pain, and skin risk scale, and skin assessment. Plan: 
Please re-consult if any wound / skin needs arise. Sebastian Luz RN,[facility] Wound Healing Office [phone] Pager [phone]

## 2018-07-27 NOTE — CDMP QUERY
Query #1 Dear Dr. Sendy López, Please clarify if this patient is (was) being treated/managed for:  
 
=> Possible Aspiration Pneumonia (POA) in the setting of unresponsiveness requiring IV antibiotics, IVF, diagnostic testing and monitoring 
=> Other explanation of clinical findings 
=> Clinically Undetermined (no explanation for clinical findings) The medical record reflects the following clinical findings, treatment, and risk factors. Risk Factors:  hx dementia Clinical Indicators:  per H&P\"unresponsive at breakfast, cyanotic\", cxr-mild interstitial edema, per NN \"shallow breathing\" Treatment: zosyn, IVF, CXR, monitoring, [email or web address] Please clarify and document your clinical opinion in the progress notes and discharge summary including the definitive and/or presumptive diagnosis, (suspected or probable), related to the above clinical findings. Please include clinical findings supporting your diagnosis. Thank You 
Luis Armando Sanon,MSN,BSN,RN,[facility] 
[phone]

## 2018-07-28 LAB
ATRIAL RATE: 62 BPM
BACTERIA SPEC CULT: NORMAL
BACTERIA SPEC CULT: NORMAL
CALCULATED P AXIS, ECG09: 88 DEGREES
CALCULATED R AXIS, ECG10: -27 DEGREES
CALCULATED T AXIS, ECG11: 131 DEGREES
DIAGNOSIS, 93000: NORMAL
GLUCOSE BLD STRIP.AUTO-MCNC: 104 MG/DL (ref 65–100)
GLUCOSE BLD STRIP.AUTO-MCNC: 115 MG/DL (ref 65–100)
GLUCOSE BLD STRIP.AUTO-MCNC: 149 MG/DL (ref 65–100)
GLUCOSE BLD STRIP.AUTO-MCNC: 86 MG/DL (ref 65–100)
P-R INTERVAL, ECG05: 220 MS
Q-T INTERVAL, ECG07: 414 MS
QRS DURATION, ECG06: 86 MS
QTC CALCULATION (BEZET), ECG08: 420 MS
SERVICE CMNT-IMP: ABNORMAL
SERVICE CMNT-IMP: NORMAL
SERVICE CMNT-IMP: NORMAL
VENTRICULAR RATE, ECG03: 62 BPM

## 2018-07-28 PROCEDURE — 94760 N-INVAS EAR/PLS OXIMETRY 1: CPT

## 2018-07-28 PROCEDURE — 74011250636 HC RX REV CODE- 250/636: Performed by: HOSPITALIST

## 2018-07-28 PROCEDURE — 94640 AIRWAY INHALATION TREATMENT: CPT

## 2018-07-28 PROCEDURE — 74011000250 HC RX REV CODE- 250: Performed by: HOSPITALIST

## 2018-07-28 PROCEDURE — 65660000000 HC RM CCU STEPDOWN

## 2018-07-28 PROCEDURE — 74011636637 HC RX REV CODE- 636/637: Performed by: HOSPITALIST

## 2018-07-28 PROCEDURE — 82962 GLUCOSE BLOOD TEST: CPT

## 2018-07-28 PROCEDURE — 74011000258 HC RX REV CODE- 258: Performed by: HOSPITALIST

## 2018-07-28 PROCEDURE — 74011000258 HC RX REV CODE- 258: Performed by: INTERNAL MEDICINE

## 2018-07-28 PROCEDURE — 77010033678 HC OXYGEN DAILY

## 2018-07-28 RX ADMIN — HEPARIN SODIUM 5000 UNITS: 5000 INJECTION INTRAVENOUS; SUBCUTANEOUS at 15:25

## 2018-07-28 RX ADMIN — ALBUTEROL SULFATE 2.5 MG: 2.5 SOLUTION RESPIRATORY (INHALATION) at 14:07

## 2018-07-28 RX ADMIN — INSULIN LISPRO 2 UNITS: 100 INJECTION, SOLUTION INTRAVENOUS; SUBCUTANEOUS at 13:06

## 2018-07-28 RX ADMIN — ALBUTEROL SULFATE 2.5 MG: 2.5 SOLUTION RESPIRATORY (INHALATION) at 20:14

## 2018-07-28 RX ADMIN — Medication 10 ML: at 13:07

## 2018-07-28 RX ADMIN — DEXTROSE MONOHYDRATE AND SODIUM CHLORIDE 100 ML/HR: 5; .9 INJECTION, SOLUTION INTRAVENOUS at 13:06

## 2018-07-28 RX ADMIN — HEPARIN SODIUM 5000 UNITS: 5000 INJECTION INTRAVENOUS; SUBCUTANEOUS at 02:47

## 2018-07-28 RX ADMIN — PIPERACILLIN SODIUM,TAZOBACTAM SODIUM 3.38 G: 3; .375 INJECTION, POWDER, FOR SOLUTION INTRAVENOUS at 00:58

## 2018-07-28 RX ADMIN — ALBUTEROL SULFATE 2.5 MG: 2.5 SOLUTION RESPIRATORY (INHALATION) at 03:07

## 2018-07-28 RX ADMIN — ALBUTEROL SULFATE 2.5 MG: 2.5 SOLUTION RESPIRATORY (INHALATION) at 08:39

## 2018-07-28 RX ADMIN — PIPERACILLIN SODIUM,TAZOBACTAM SODIUM 3.38 G: 3; .375 INJECTION, POWDER, FOR SOLUTION INTRAVENOUS at 17:00

## 2018-07-28 RX ADMIN — PIPERACILLIN SODIUM,TAZOBACTAM SODIUM 3.38 G: 3; .375 INJECTION, POWDER, FOR SOLUTION INTRAVENOUS at 10:44

## 2018-07-28 NOTE — PROGRESS NOTES
Bedside shift change report given to Grace Weber (oncoming nurse) by Salena Soler (offgoing nurse). Report included the following information SBAR, Kardex, Intake/Output and MAR.

## 2018-07-28 NOTE — PROGRESS NOTES
Hospitalist Progress Note Erasmo Mora MD 
Answering service: [phone] OR [phone] from in house phone Cell: [phone] Date of Service:  2018 NAME:  Amina Guerrero :  1920 MRN:  [id number] Admission Summary:  
40-year-old white female with history of CKD, diabetes, dementia, lives in [address] the [facility] long-term. The staff reported the patient was found cyanotic after breakfast.  The staff believe the patient may have aspirated and the patient was put on O2 by EMS and her sats is 94% on 4 liters when she arrived in the ED. In the ED, noted the patient was hypothermic, temperature is 94, so she was put on [employer]. Interval history / Subjective:  
  Patient does not communicate audibly at base line. Her daughter was in the room and patient mouthed \"I want to go home\" in barely audible sounds. Mainor Miller Assessment & Plan: 1. Hypoxia, acute respiratory failure. Improved remarkably from initial description. On intravenous Zosyn. Discussed at length with daughter and they do not want to pursue aggressive intervention and if her if her condition worsens, they will focus on comfort care. At this time, Daughrer is open to speaking to Hospice  I will place a consult for Hospice. 2.  Hypothermia may be due to sepsis or failure to thrive with her age. - Resolved. 3.  Hypotension.   
- Improved with fluid boluses 4. Diabetes. Hold oral medicine. May give sliding scales. Code status: DNR 
DVT prophylaxis: Heparin Care Plan discussed with: Patient/Family and Nurse Disposition: TBD Hospital Problems  Never Reviewed Codes Class Noted POA * (Principal)Hypothermia ICD-10-CM: T68. Veronika Ortiz ICD-9-CM: 991.6  2018 Yes Review of Systems: A comprehensive review of systems was negative. Vital Signs:  
 Last 24hrs VS reviewed since prior progress note.  Most recent are: 
Visit Vitals  /57 (BP 1 Location: Left arm)  Pulse (!) 58  Temp 97.6 °F (36.4 °C)  Resp 16  
 Ht 5' (1.524 m)  Wt 69.4 kg (153 lb)  SpO2 95%  BMI 29.88 kg/m2 Intake/Output Summary (Last 24 hours) at 07/27/18 2248 Last data filed at 07/27/18 1421 Gross per 24 hour Intake                0 ml Output                1 ml Net               -1 ml Physical Examination:  
 
 
     
Constitutional:  No acute distress, cooperative, pleasant   
ENT:  Oral mucous moist, oropharynx benign. Neck supple, Resp:  CTA bilaterally. No wheezing/rhonchi/rales. No accessory muscle use CV:  Regular rhythm, normal rate, no murmurs, gallops, rubs GI:  Soft, non distended, non tender. normoactive bowel sounds, no hepatosplenomegaly Musculoskeletal:  No edema, warm, 2+ pulses throughout Neurologic:  Moves all extremities. AAOx3, CN II-XII reviewed Data Review:  
 Review and/or order of clinical lab test 
 
 
Labs:  
 
Recent Labs  
   07/27/18 
 0311  07/26/18 
 1720 WBC  10.3  13.4* HGB  9.5*  11.6 HCT  31.7*  37.9 PLT  96*  128* Recent Labs  
   07/27/18 
 0311  07/26/18 
 1720 NA  153*  147* K  4.1  4.0  
CL  115*  106 CO2  32  33* BUN  35*  48* CREA  1.31*  1.89* GLU  59*  149* CA  7.8*  9.2 MG  1.9  2.1 PHOS  2.8   --   
 
Recent Labs  
   07/27/18 
 6383 SGOT  36 ALT  28 AP  87 TBILI  0.2 TP  5.7* ALB  2.5*  
GLOB  3.2 Recent Labs  
   07/26/18 
 1720 INR  1.0 PTP  10.4 APTT  26.2 No results for input(s): FE, TIBC, PSAT, FERR in the last 72 hours. No results found for: FOL, RBCF No results for input(s): PH, PCO2, PO2 in the last 72 hours. Recent Labs  
   07/26/18 
 1720 TROIQ  <0.05 No results found for: CHOL, CHOLX, CHLST, CHOLV, HDL, LDL, LDLC, DLDLP, TGLX, TRIGL, TRIGP, CHHD, CHHDX Lab Results Component Value Date/Time  Glucose (POC) 134 (H) 07/27/2018 10:05 PM  
 Glucose (POC) 206 (H) 07/27/2018 05:25 PM  
 Glucose (POC) 73 07/27/2018 04:51 PM  
 Glucose (POC) 87 07/27/2018 01:57 PM  
 Glucose (POC) 67 07/27/2018 11:27 AM  
 
Lab Results Component Value Date/Time Color YELLOW/STRAW 07/26/2018 05:20 PM  
 Appearance CLEAR 07/26/2018 05:20 PM  
 Specific gravity 1.016 07/26/2018 05:20 PM  
 pH (UA) 5.0 07/26/2018 05:20 PM  
 Protein NEGATIVE  07/26/2018 05:20 PM  
 Glucose NEGATIVE  07/26/2018 05:20 PM  
 Ketone NEGATIVE  07/26/2018 05:20 PM  
 Bilirubin NEGATIVE  07/26/2018 05:20 PM  
 Urobilinogen 0.2 07/26/2018 05:20 PM  
 Nitrites NEGATIVE  07/26/2018 05:20 PM  
 Leukocyte Esterase NEGATIVE  07/26/2018 05:20 PM  
 Epithelial cells FEW 07/26/2018 05:20 PM  
 Bacteria NEGATIVE  07/26/2018 05:20 PM  
 WBC 0-4 07/26/2018 05:20 PM  
 RBC 0-5 07/26/2018 05:20 PM  
 
 
 
Medications Reviewed:  
 
Current Facility-Administered Medications Medication Dose Route Frequency  albuterol (PROVENTIL VENTOLIN) nebulizer solution 2.5 mg  2.5 mg Nebulization Q6H RT  
 [START ON 7/28/2018] levothyroxine (SYNTHROID) tablet 50 mcg  50 mcg Oral ACB  [START ON 7/28/2018] pantoprazole (PROTONIX) tablet 40 mg  40 mg Oral ACB  [START ON 7/28/2018] sertraline (ZOLOFT) tablet 25 mg  25 mg Oral DAILY  piperacillin-tazobactam (ZOSYN) 3.375 g in 0.9% sodium chloride (MBP/ADV) 100 mL  3.375 g IntraVENous Q8H  
 sodium chloride (NS) flush 5-10 mL  5-10 mL IntraVENous Q8H  
 sodium chloride (NS) flush 5-10 mL  5-10 mL IntraVENous PRN  
 heparin (porcine) injection 5,000 Units  5,000 Units SubCUTAneous Q12H  
 glucose chewable tablet 16 g  4 Tab Oral PRN  
 dextrose (D50W) injection syrg 12.5-25 g  12.5-25 g IntraVENous PRN  
 glucagon (GLUCAGEN) injection 1 mg  1 mg IntraMUSCular PRN  
 insulin lispro (HUMALOG) injection   SubCUTAneous AC&HS  
 dextrose 5% and 0.9% NaCl infusion  100 mL/hr IntraVENous CONTINUOUS ______________________________________________________________________ EXPECTED LENGTH OF STAY: 4d 21h ACTUAL LENGTH OF STAY:          1 Alf Almonte MD

## 2018-07-28 NOTE — PROGRESS NOTES
Bedside and Verbal shift change report given to Mariluz Baltazar RN (oncoming nurse) by Larna Harada, RN (offgoing nurse). Report included the following information SBAR, Kardex, Intake/Output, MAR, Accordion and Recent Results.

## 2018-07-28 NOTE — PROGRESS NOTES
Bedside and Verbal shift change report given to Jud Perez (oncoming nurse) by Amadeo Viera nurse). Report included the following information SBAR, Kardex, Intake/Output and MAR.

## 2018-07-28 NOTE — PROGRESS NOTES
Hospitalist Progress Note Cullen Kan MD 
Answering service: [phone] OR [phone] from in house phone Cell: [phone] Date of Service:  2018 NAME:  Omar Caraballo :  1920 MRN:  [id number] Admission Summary:  
80-year-old white female with history of CKD, diabetes, dementia, lives in [address] the [facility] long-term. The staff reported the patient was found cyanotic after breakfast.  The staff believe the patient may have aspirated and the patient was put on O2 by EMS and her sats is 94% on 4 liters when she arrived in the ED. In the ED, noted the patient was hypothermic, temperature is 94, so she was put on [employer]. Interval history / Subjective: No acute events. Case discussed with daughter who is thinking to take her back to NH on Monday if not better . Will ask Hospice to evaluate. Assessment & Plan: 1. Hypoxia, acute respiratory failure. Improved remarkably from initial description. On intravenous Zosyn. Discussed at length with daughter and they do not want to pursue aggressive intervention and if her if her condition worsens, they will focus on comfort care. At this time, Kenton is open to speaking to Hospice  I will place a consult for Hospice. 2.  Hypothermia may be due to sepsis or failure to thrive with her age. - Resolved. 3.  Hypotension.   
- Improved with fluid boluses 4. Diabetes. Hold oral medicine. May give sliding scales. 5. Hypernatremia on Hypotonic solutions. Code status: DNR 
DVT prophylaxis: Heparin Care Plan discussed with: Patient/Family and Nurse Disposition: TBD Hospital Problems  Never Reviewed Codes Class Noted POA * (Principal)Hypothermia ICD-10-CM: T68. Mariana Weaver ICD-9-CM: 991.6  2018 Yes Review of Systems: A comprehensive review of systems was negative.   
 
 
Vital Signs:  
 Last 24hrs VS reviewed since prior progress note. Most recent are: 
Visit Vitals  /78  Pulse 73  Temp 98 °F (36.7 °C)  Resp 18  Ht 5' (1.524 m)  Wt 69.4 kg (153 lb)  SpO2 95%  BMI 29.88 kg/m2 Intake/Output Summary (Last 24 hours) at 07/28/18 6480 Last data filed at 07/28/18 2253 Gross per 24 hour Intake             1200 ml Output                1 ml Net             1199 ml Physical Examination:  
 
 
     
Constitutional:  No acute distress, cooperative, pleasant   
ENT:  Oral mucous moist, oropharynx benign. Neck supple, Resp:  CTA bilaterally. No wheezing/rhonchi/rales. No accessory muscle use CV:  Regular rhythm, normal rate, no murmurs, gallops, rubs GI:  Soft, non distended, non tender. normoactive bowel sounds, no hepatosplenomegaly Musculoskeletal:  No edema, warm, 2+ pulses throughout Neurologic:  Moves all extremities. AAOx3, CN II-XII reviewed Data Review:  
 Review and/or order of clinical lab test 
 
 
Labs:  
 
Recent Labs  
   07/27/18 
 0311  07/26/18 
 1720 WBC  10.3  13.4* HGB  9.5*  11.6 HCT  31.7*  37.9 PLT  96*  128* Recent Labs  
   07/27/18 
 0311  07/26/18 
 1720 NA  153*  147* K  4.1  4.0  
CL  115*  106 CO2  32  33* BUN  35*  48* CREA  1.31*  1.89* GLU  59*  149* CA  7.8*  9.2 MG  1.9  2.1 PHOS  2.8   --   
 
Recent Labs  
   07/27/18 
 7364 SGOT  36 ALT  28 AP  87 TBILI  0.2 TP  5.7* ALB  2.5*  
GLOB  3.2 Recent Labs  
   07/26/18 
 1720 INR  1.0 PTP  10.4 APTT  26.2 No results for input(s): FE, TIBC, PSAT, FERR in the last 72 hours. No results found for: FOL, RBCF No results for input(s): PH, PCO2, PO2 in the last 72 hours. Recent Labs  
   07/26/18 
 1720 TROIQ  <0.05 No results found for: CHOL, CHOLX, CHLST, CHOLV, HDL, LDL, LDLC, DLDLP, TGLX, TRIGL, TRIGP, CHHD, CHHDX Lab Results Component Value Date/Time  Glucose (POC) 115 (H) 07/28/2018 07:05 AM  
 Glucose (POC) 134 (H) 07/27/2018 10:05 PM  
 Glucose (POC) 206 (H) 07/27/2018 05:25 PM  
 Glucose (POC) 73 07/27/2018 04:51 PM  
 Glucose (POC) 87 07/27/2018 01:57 PM  
 
Lab Results Component Value Date/Time Color YELLOW/STRAW 07/26/2018 05:20 PM  
 Appearance CLEAR 07/26/2018 05:20 PM  
 Specific gravity 1.016 07/26/2018 05:20 PM  
 pH (UA) 5.0 07/26/2018 05:20 PM  
 Protein NEGATIVE  07/26/2018 05:20 PM  
 Glucose NEGATIVE  07/26/2018 05:20 PM  
 Ketone NEGATIVE  07/26/2018 05:20 PM  
 Bilirubin NEGATIVE  07/26/2018 05:20 PM  
 Urobilinogen 0.2 07/26/2018 05:20 PM  
 Nitrites NEGATIVE  07/26/2018 05:20 PM  
 Leukocyte Esterase NEGATIVE  07/26/2018 05:20 PM  
 Epithelial cells FEW 07/26/2018 05:20 PM  
 Bacteria NEGATIVE  07/26/2018 05:20 PM  
 WBC 0-4 07/26/2018 05:20 PM  
 RBC 0-5 07/26/2018 05:20 PM  
 
 
 
Medications Reviewed:  
 
Current Facility-Administered Medications Medication Dose Route Frequency  albuterol (PROVENTIL VENTOLIN) nebulizer solution 2.5 mg  2.5 mg Nebulization Q6H RT  
 levothyroxine (SYNTHROID) tablet 50 mcg  50 mcg Oral ACB  pantoprazole (PROTONIX) tablet 40 mg  40 mg Oral ACB  sertraline (ZOLOFT) tablet 25 mg  25 mg Oral DAILY  piperacillin-tazobactam (ZOSYN) 3.375 g in 0.9% sodium chloride (MBP/ADV) 100 mL  3.375 g IntraVENous Q8H  
 sodium chloride (NS) flush 5-10 mL  5-10 mL IntraVENous Q8H  
 sodium chloride (NS) flush 5-10 mL  5-10 mL IntraVENous PRN  
 heparin (porcine) injection 5,000 Units  5,000 Units SubCUTAneous Q12H  
 glucose chewable tablet 16 g  4 Tab Oral PRN  
 dextrose (D50W) injection syrg 12.5-25 g  12.5-25 g IntraVENous PRN  
 glucagon (GLUCAGEN) injection 1 mg  1 mg IntraMUSCular PRN  
 insulin lispro (HUMALOG) injection   SubCUTAneous AC&HS  
 dextrose 5% and 0.9% NaCl infusion  100 mL/hr IntraVENous CONTINUOUS  
 
______________________________________________________________________ EXPECTED LENGTH OF STAY: 4d 21h ACTUAL LENGTH OF STAY: [address] [place] [address] MD

## 2018-07-28 NOTE — PROGRESS NOTES
Problem: Falls - Risk of 
Goal: *Absence of Falls Document Shellie Guerrero Fall Risk and appropriate interventions in the flowsheet. Outcome: Progressing Towards Goal 
Fall Risk Interventions: 
  
 
Mentation Interventions: Adequate sleep, hydration, pain control Medication Interventions: Patient to call before getting OOB Elimination Interventions: Patient to call for help with toileting needs

## 2018-07-29 LAB
GLUCOSE BLD STRIP.AUTO-MCNC: 111 MG/DL (ref 65–100)
GLUCOSE BLD STRIP.AUTO-MCNC: 141 MG/DL (ref 65–100)
GLUCOSE BLD STRIP.AUTO-MCNC: 169 MG/DL (ref 65–100)
SERVICE CMNT-IMP: ABNORMAL

## 2018-07-29 PROCEDURE — 82962 GLUCOSE BLOOD TEST: CPT

## 2018-07-29 PROCEDURE — 74011000258 HC RX REV CODE- 258: Performed by: INTERNAL MEDICINE

## 2018-07-29 PROCEDURE — 74011000250 HC RX REV CODE- 250: Performed by: HOSPITALIST

## 2018-07-29 PROCEDURE — 94760 N-INVAS EAR/PLS OXIMETRY 1: CPT

## 2018-07-29 PROCEDURE — 74011250637 HC RX REV CODE- 250/637: Performed by: HOSPITALIST

## 2018-07-29 PROCEDURE — 74011636637 HC RX REV CODE- 636/637: Performed by: HOSPITALIST

## 2018-07-29 PROCEDURE — 94640 AIRWAY INHALATION TREATMENT: CPT

## 2018-07-29 PROCEDURE — 74011000258 HC RX REV CODE- 258: Performed by: HOSPITALIST

## 2018-07-29 PROCEDURE — 65660000000 HC RM CCU STEPDOWN

## 2018-07-29 PROCEDURE — 74011250636 HC RX REV CODE- 250/636: Performed by: HOSPITALIST

## 2018-07-29 RX ADMIN — PIPERACILLIN SODIUM,TAZOBACTAM SODIUM 3.38 G: 3; .375 INJECTION, POWDER, FOR SOLUTION INTRAVENOUS at 09:45

## 2018-07-29 RX ADMIN — DEXTROSE MONOHYDRATE AND SODIUM CHLORIDE 100 ML/HR: 5; .9 INJECTION, SOLUTION INTRAVENOUS at 14:26

## 2018-07-29 RX ADMIN — ALBUTEROL SULFATE 2.5 MG: 2.5 SOLUTION RESPIRATORY (INHALATION) at 13:36

## 2018-07-29 RX ADMIN — HEPARIN SODIUM 5000 UNITS: 5000 INJECTION INTRAVENOUS; SUBCUTANEOUS at 02:13

## 2018-07-29 RX ADMIN — INSULIN LISPRO 2 UNITS: 100 INJECTION, SOLUTION INTRAVENOUS; SUBCUTANEOUS at 07:25

## 2018-07-29 RX ADMIN — HEPARIN SODIUM 5000 UNITS: 5000 INJECTION INTRAVENOUS; SUBCUTANEOUS at 14:24

## 2018-07-29 RX ADMIN — ALBUTEROL SULFATE 2.5 MG: 2.5 SOLUTION RESPIRATORY (INHALATION) at 02:19

## 2018-07-29 RX ADMIN — ALBUTEROL SULFATE 2.5 MG: 2.5 SOLUTION RESPIRATORY (INHALATION) at 07:44

## 2018-07-29 RX ADMIN — LEVOTHYROXINE SODIUM 50 MCG: 50 TABLET ORAL at 07:26

## 2018-07-29 RX ADMIN — Medication 5 ML: at 14:00

## 2018-07-29 RX ADMIN — PIPERACILLIN SODIUM,TAZOBACTAM SODIUM 3.38 G: 3; .375 INJECTION, POWDER, FOR SOLUTION INTRAVENOUS at 00:45

## 2018-07-29 NOTE — PROGRESS NOTES
Care Management Interventions PCP Verified by CM: Yes 
Palliative Care Criteria Met (RRAT>21 & CHF Dx)?: Yes 
Palliative Consult Recommended?: No 
Reason Palliative Care Not Recommended?:  (being admitted to hospice) Mode of Transport at Discharge: BLS Transition of Care Consult (CM Consult): Home Hospice [facility] Hospice: No 
Reason Outside [place]:  ([facility] has their own hospice) MyChart Signup: No 
Discharge Durable Medical Equipment: No 
Physical Therapy Consult: No 
Occupational Therapy Consult: No 
Speech Therapy Consult: No 
Current Support Network: Nursing Facility ([facility]) Plan discussed with Pt/Family/Caregiver: Yes Freedom of Choice Offered: Yes [employer] Information Provided?: No 
Discharge Location Discharge Placement: Home with hospice Reason for Admission:   Hypoxia, acute respiratory failure RRAT Score:  18 Do you (patient/family) have any concerns for transition/discharge? No 
           
Plan for utilizing home health:  Not appropriate, will have hospice Likelihood of readmission?   low Transition of Care Plan:      Consult received for hospice, sent referral to Manjit Guzman. Cm met with patients' daughter Merritt Kussmaul, [phone]) at the bedside to explain role and offer support. Cassie Barron stated that the plan is for patient to be discharged tomorrow (Monday) back to [facility], where they will do their own hospice. Cassie Barron had asked to speak with Manjit Mckeon Thomas in the meantime, because she had questions and wanted the support. Hospice was called yesterday but did not meet with her. Cm called them again ([phone]) and paged the on-call nurse; waiting for a call back. Cassie Barron would like patient to be discharged in the morning tomorrow, if possible. She will need ambulance transport.  
[employer] , [place]

## 2018-07-29 NOTE — PROGRESS NOTES
Hospitalist Progress Note Mona Alvares MD 
Answering service: [phone] from in house phone Cell: [phone] Date of Service:  2018 NAME:  Elva Worrell :  1920 MRN:  [id number] Admission Summary:  
68-year-old white female with history of CKD, diabetes, dementia, lives in [address] the [facility] long-term. The staff reported the patient was found cyanotic after breakfast.  The staff believe the patient may have aspirated and the patient was put on O2 by EMS and her sats is 94% on 4 liters when she arrived in the ED. In the ED, noted the patient was hypothermic, temperature is 94, so she was put on [employer]. Interval history / Subjective: No acute events. Case discussed with daughter and adapted son they will take her back to NH on Monday under hospice care. No acute events patient does not respond much to verbal communication. Assessment & Plan: 1. Hypoxia, acute respiratory failure. On intravenous Zosyn. Discussed at length with daughter and they do not want to pursue aggressive intervention and if her if her condition worsens, they will focus on comfort care. At this time, Dajoserer is open to speaking to Hospice  I will place a consult for Hospice. 2.  Hypothermia may be due to sepsis or failure to thrive with her age. - Resolved. 3.  Hypotension.   
- Improved with fluid boluses 4. Diabetes. Hold oral medicine. May give sliding scales. 5. Hypernatremia on Hypotonic solutions. Code status: DNR 
DVT prophylaxis: Heparin Care Plan discussed with: Patient/Family and Nurse Disposition: TBD Hospital Problems  Never Reviewed Codes Class Noted POA * (Principal)Hypothermia ICD-10-CM: T68. Aleida Diane ICD-9-CM: 991.6  2018 Yes Review of Systems: A comprehensive review of systems was negative.   
 
 
Vital Signs:  
 Last 24hrs VS reviewed since prior progress note. Most recent are: 
Visit Vitals  /75 (BP 1 Location: Left arm)  Pulse 70  Temp 99.1 °F (37.3 °C)  Resp 18  Ht 5' (1.524 m)  Wt 69.4 kg (153 lb)  SpO2 95%  BMI 29.88 kg/m2 Intake/Output Summary (Last 24 hours) at 07/29/18 3248 Last data filed at 07/29/18 8648 Gross per 24 hour Intake             2000 ml Output                0 ml Net             2000 ml Physical Examination:  
 
 
     
Constitutional:  No acute distress, cooperative, pleasant   
ENT:  Oral mucous moist, oropharynx benign. Neck supple, Resp:  CTA bilaterally. No wheezing/rhonchi/rales. No accessory muscle use CV:  Regular rhythm, normal rate, no murmurs, gallops, rubs GI:  Soft, non distended, non tender. normoactive bowel sounds, no hepatosplenomegaly Musculoskeletal:  No edema, warm, 2+ pulses throughout Neurologic:  Moves all extremities. AAOx3, CN II-XII reviewed Data Review:  
 Review and/or order of clinical lab test 
 
 
Labs:  
 
Recent Labs  
   07/27/18 
 0311  07/26/18 
 1720 WBC  10.3  13.4* HGB  9.5*  11.6 HCT  31.7*  37.9 PLT  96*  128* Recent Labs  
   07/27/18 
 0311  07/26/18 
 1720 NA  153*  147* K  4.1  4.0  
CL  115*  106 CO2  32  33* BUN  35*  48* CREA  1.31*  1.89* GLU  59*  149* CA  7.8*  9.2 MG  1.9  2.1 PHOS  2.8   --   
 
Recent Labs  
   07/27/18 
 2501 SGOT  36 ALT  28 AP  87 TBILI  0.2 TP  5.7* ALB  2.5*  
GLOB  3.2 Recent Labs  
   07/26/18 
 1720 INR  1.0 PTP  10.4 APTT  26.2 No results for input(s): FE, TIBC, PSAT, FERR in the last 72 hours. No results found for: FOL, RBCF No results for input(s): PH, PCO2, PO2 in the last 72 hours. Recent Labs  
   07/26/18 
 1720 TROIQ  <0.05 No results found for: CHOL, CHOLX, CHLST, CHOLV, HDL, LDL, LDLC, DLDLP, TGLX, TRIGL, TRIGP, CHHD, CHHDX Lab Results Component Value Date/Time  Glucose (POC) 141 (H) 07/29/2018 06:26 AM  
 Glucose (POC) 104 (H) 07/28/2018 09:25 PM  
 Glucose (POC) 86 07/28/2018 04:34 PM  
 Glucose (POC) 149 (H) 07/28/2018 11:19 AM  
 Glucose (POC) 115 (H) 07/28/2018 07:05 AM  
 
Lab Results Component Value Date/Time Color YELLOW/STRAW 07/26/2018 05:20 PM  
 Appearance CLEAR 07/26/2018 05:20 PM  
 Specific gravity 1.016 07/26/2018 05:20 PM  
 pH (UA) 5.0 07/26/2018 05:20 PM  
 Protein NEGATIVE  07/26/2018 05:20 PM  
 Glucose NEGATIVE  07/26/2018 05:20 PM  
 Ketone NEGATIVE  07/26/2018 05:20 PM  
 Bilirubin NEGATIVE  07/26/2018 05:20 PM  
 Urobilinogen 0.2 07/26/2018 05:20 PM  
 Nitrites NEGATIVE  07/26/2018 05:20 PM  
 Leukocyte Esterase NEGATIVE  07/26/2018 05:20 PM  
 Epithelial cells FEW 07/26/2018 05:20 PM  
 Bacteria NEGATIVE  07/26/2018 05:20 PM  
 WBC 0-4 07/26/2018 05:20 PM  
 RBC 0-5 07/26/2018 05:20 PM  
 
 
 
Medications Reviewed:  
 
Current Facility-Administered Medications Medication Dose Route Frequency  albuterol (PROVENTIL VENTOLIN) nebulizer solution 2.5 mg  2.5 mg Nebulization Q6H RT  
 levothyroxine (SYNTHROID) tablet 50 mcg  50 mcg Oral ACB  pantoprazole (PROTONIX) tablet 40 mg  40 mg Oral ACB  sertraline (ZOLOFT) tablet 25 mg  25 mg Oral DAILY  piperacillin-tazobactam (ZOSYN) 3.375 g in 0.9% sodium chloride (MBP/ADV) 100 mL  3.375 g IntraVENous Q8H  
 sodium chloride (NS) flush 5-10 mL  5-10 mL IntraVENous Q8H  
 sodium chloride (NS) flush 5-10 mL  5-10 mL IntraVENous PRN  
 heparin (porcine) injection 5,000 Units  5,000 Units SubCUTAneous Q12H  
 glucose chewable tablet 16 g  4 Tab Oral PRN  
 dextrose (D50W) injection syrg 12.5-25 g  12.5-25 g IntraVENous PRN  
 glucagon (GLUCAGEN) injection 1 mg  1 mg IntraMUSCular PRN  
 insulin lispro (HUMALOG) injection   SubCUTAneous AC&HS  
 dextrose 5% and 0.9% NaCl infusion  100 mL/hr IntraVENous CONTINUOUS  
 
______________________________________________________________________ EXPECTED LENGTH OF STAY: 4d 21h ACTUAL LENGTH OF STAY:          3 Carmel Borrero MD

## 2018-07-29 NOTE — PROGRESS NOTES
Bedside and Verbal shift change report given to Sonia Baird (oncoming nurse) by Dana Robbins (offgoing nurse). Report included the following information SBAR.

## 2018-07-29 NOTE — PROGRESS NOTES
Problem: Pressure Injury - Risk of 
Goal: *Prevention of pressure injury Document Eleuterio Scale and appropriate interventions in the flowsheet. Outcome: Progressing Towards Goal 
Pressure Injury Interventions: 
Sensory Interventions: Assess changes in LOC Moisture Interventions: Absorbent underpads Activity Interventions: Pressure redistribution bed/mattress(bed type) Mobility Interventions: Float heels, Pressure redistribution bed/mattress (bed type) Nutrition Interventions: Offer support with meals,snacks and hydration Friction and Shear Interventions: Apply protective barrier, creams and emollients, HOB 30 degrees or less

## 2018-07-29 NOTE — PROGRESS NOTES
Bedside and Verbal shift change report given to India Coles (oncoming nurse) by Ulises William (offgoing nurse). Report included the following information SBAR and Kardex.

## 2018-07-29 NOTE — HOSPICE
[employer] RN consultation visit note. Order for hospice noted. History and events of this hospitalization reviewed. TC to daughter Amanda Ellis and I introduced myself. Cassie Barron said plans are for patient to be transported back to The [address] in the morning and will be under the care of hospice provided by the [facility]. Support given. Cassie Barron said her brothers have some questions about hospice care so as a courtesy plans are to meet at 1700 in patient's room Please call [phone] if questions or concerns Galdino Cobian, JACLYN [facility]

## 2018-07-29 NOTE — PROGRESS NOTES
Bedside shift change report given to Juli Lemus RN (oncoming nurse) by Magdalena Chen RN (offgoing nurse). Report included the following information SBAR.

## 2018-07-29 NOTE — PROGRESS NOTES
Bedside and Verbal shift change report given to Alyssa Santacruz RN (oncoming nurse) by Juliana Patino RN (offgoing nurse). Report included the following information SBAR, Kardex, Intake/Output, MAR, Accordion and Recent Results.

## 2018-07-30 ENCOUNTER — HOSPICE ADMISSION (OUTPATIENT)
Dept: HOSPICE | Facility: HOSPICE | Age: 83
End: 2018-07-30

## 2018-07-30 VITALS
DIASTOLIC BLOOD PRESSURE: 65 MMHG | SYSTOLIC BLOOD PRESSURE: 115 MMHG | HEART RATE: 131 BPM | RESPIRATION RATE: 26 BRPM | BODY MASS INDEX: 30.04 KG/M2 | TEMPERATURE: 99 F | HEIGHT: 60 IN | WEIGHT: 153 LBS | OXYGEN SATURATION: 62 %

## 2018-07-30 PROBLEM — T68.XXXA HYPOTHERMIA: Status: RESOLVED | Noted: 2018-07-26 | Resolved: 2018-07-30

## 2018-07-30 LAB
GLUCOSE BLD STRIP.AUTO-MCNC: 149 MG/DL (ref 65–100)
SERVICE CMNT-IMP: ABNORMAL

## 2018-07-30 PROCEDURE — 74011000258 HC RX REV CODE- 258: Performed by: INTERNAL MEDICINE

## 2018-07-30 PROCEDURE — 82962 GLUCOSE BLOOD TEST: CPT

## 2018-07-30 RX ORDER — MAGNESIUM SULFATE HEPTAHYDRATE 40 MG/ML
2 INJECTION, SOLUTION INTRAVENOUS ONCE
Status: DISCONTINUED | OUTPATIENT
Start: 2018-07-30 | End: 2018-07-30 | Stop reason: HOSPADM

## 2018-07-30 RX ORDER — GLYCOPYRROLATE 0.2 MG/ML
0.2 INJECTION INTRAMUSCULAR; INTRAVENOUS
Status: DISCONTINUED | OUTPATIENT
Start: 2018-07-30 | End: 2018-07-30 | Stop reason: HOSPADM

## 2018-07-30 RX ADMIN — DEXTROSE MONOHYDRATE AND SODIUM CHLORIDE 100 ML/HR: 5; .9 INJECTION, SOLUTION INTRAVENOUS at 02:38

## 2018-07-30 NOTE — PROGRESS NOTES
PT clearance per N/A, [facility] set up per CRM, DC order per Mirela Muñiz MD 
 
I have reviewed discharge instructions with the family. The familyverbalized understanding. All belongings packed and sent to DC; phone wallet keys glasses NO prescriptions, instructions. IV removed. Report called to Marisela included SBAR, I/Os MAR,

## 2018-07-30 NOTE — PROGRESS NOTES
Chart reviewed. The family would like for CRM to set up ambulance transport to [facility] today. The plan was for [facility] once there. Dr. Cong Brown states that the patient is actively dying and she feels that the patient may pass during transport. She asked for CRM to contact Hospice to speak with the family. CRM called and spoke with Marilin Ogden. She will pass on the message.  Hospice had met with the family on 7/29 for an informational sessions. Dr. Cong Brown feels at this point the patient meets in patient criteria. CRM will continue to follow. KJT 
 
10:01am: YVONNE spoke with Ruth with [facility]. They plans to touch base with the family/contact CRM with the final plan. KJT 
 
11:08 am: YVONNE spoke with Rey Michelle from [facility]. She stated that [facility] do their own hospice/comfort care. She will meet with the family to determine inpatient criteria/final discharge plan. KJT 
 
11:21am: Rey Michelle from [facility] met with the family. They would like for the patient to return back to [facility] via ambulance today. CRM requested a 1pm time. DDNR will need to be signed. Will follow. KJT 
 
1pm [facility] time confirmed. CRM spoke with the family and Mardeen Pals at [facility] [phone]. Folder on the chart with report #. DDNR to filled out before transport.  KJT

## 2018-07-30 NOTE — HOSPICE
[facility] Liaison note: I spoke with family today in regard to admitting to hospice, or returning to [facility] with their hospice care. Family is adamant that patient return to [facility] as that is her home. I do think patient is within hours of passing away and have made family aware that patient may pass before transport, or even during transport. They are accepting of this and are willing to take the risk of having her transported. We did discuss signs and symptoms of end of life and understood that we cannot give a timeframe. I spoke with Dr. Soy Phan and let her know that their wish is for her to return immediately to [facility]. I also notified CM of family's decision. Thank you, 
 
Anna Quinones RN [facility]

## 2018-07-30 NOTE — PROGRESS NOTES
Patient's condition is deteriorating, requiring frequent suctioning. Breathing has become increasingly labored and wet. [facility] called to report an episode of VTach. Paged Dr Reyes Cardoza to report the situation and was instructed that she may be recommended to enter into inpatient hospice to avoid transportation. The family has expressed that they would prefer the patient to [facility]. Called the number listed in the hospice note to explain the situation, and was told that the care team would call the unit with more information.

## 2018-07-30 NOTE — PROGRESS NOTES
Visited pt on 5N at family's request. Pt's daughter and two sons at bedside. They wish for the patient to transferred back to [facility], where she has lived for some 10 plus years. They have been informed that it may not be possible due to the fact that she is rapidly declining and may not be able to survive the transport. Listened to their concerns and offered prayer at their request. Rosette Wynne will follow as needed. Chaplain Gil MDiv, MS, [facility] 
[phone] ([phone])

## 2018-07-30 NOTE — DISCHARGE SUMMARY
Discharge Summary       PATIENT ID: Terrell Metcalf  MRN: [id number]   YOB: 1920    DATE OF ADMISSION: 7/26/2018  4:22 PM    DATE OF DISCHARGE: 7/30/2018   PRIMARY CARE PROVIDER: Scott Islas MD     DISCHARGING PROVIDER: Kandy Young MD    To contact this individual call [phone] and ask the  to page. If unavailable ask to be transferred the Adult Hospitalist Department. CONSULTATIONS: IP CONSULT TO HOSPITALIST    PROCEDURES/SURGERIES: * No surgery found *    ADMITTING DIAGNOSES & HOSPITAL COURSE:     19-year-old white female with history of CKD, diabetes, dementia, lives in [address] long-term. The staff reported the patient was found cyanotic after breakfast. The staff believe the patient may have aspirated and the patient was put on O2 by EMS and her sats is 94% on 4 liters when she arrived in the ED.  In the ED, noted the patient was hypothermic, temperature is 94, so she was put on [employer]. Family has made the decision to move to comfort/hospice care at the [address]. DISCHARGE DIAGNOSES / PLAN:      As per hospice. PENDING TEST RESULTS:   At the time of discharge the following test results are still pending: None    FOLLOW UP APPOINTMENTS:    Follow-up Information     Follow up With Details Comments Contact [address] / comfort care       Scott Islas, [address] [address]  [phone]             ADDITIONAL CARE RECOMMENDATIONS:  Per hospice. DISCHARGE MEDICATIONS:  Current Discharge Medication List      CONTINUE these medications which have NOT CHANGED    Details   levETIRAcetam (KEPPRA) 250 mg tablet Take 250 mg by mouth two (2) times a day. guaiFENesin (ROBITUSSIN) 100 mg/5 mL liquid Take 100 mg by mouth four (4) times daily as needed for Cough.       acetaminophen (TYLENOL) 500 mg tablet Take 1,000 mg by mouth two (2) times daily as needed for Pain or Fever. levothyroxine (SYNTHROID) 50 mcg tablet Take 50 mcg by mouth Daily (before breakfast). pantoprazole (PROTONIX) 40 mg tablet Take 40 mg by mouth daily. sertraline (ZOLOFT) 25 mg tablet Take 25 mg by mouth daily. STOP taking these medications       albuterol (PROVENTIL VENTOLIN) 2.5 mg /3 mL (0.083 %) nebulizer solution Comments:   Reason for Stopping:         bumetanide (BUMEX) 2 mg tablet Comments:   Reason for Stopping:         amLODIPine (NORVASC) 5 mg tablet Comments:   Reason for Stopping:         doxazosin (CARDURA) 1 mg tablet Comments:   Reason for Stopping:         glimepiride (AMARYL) 2 mg tablet Comments:   Reason for Stopping:         ferrous sulfate (SLOW FE) 142 mg (45 mg iron) ER tablet Comments:   Reason for Stopping:         ketoconazole (NIZORAL) 2 % topical cream Comments:   Reason for Stopping:         potassium chloride SR (KLOR-CON 8) 8 mEq tablet Comments:   Reason for Stopping:         memantine-donepezil 28-10 mg CSpX Comments:   Reason for Stopping:               NOTIFY YOUR PHYSICIAN FOR ANY OF THE FOLLOWING:   Fever over 101 degrees for 24 hours. Chest pain, shortness of breath, fever, chills, nausea, vomiting, diarrhea, change in mentation, falling, weakness, bleeding. Severe pain or pain not relieved by medications. Or, any other signs or symptoms that you may have questions about.     DISPOSITION:    Home With:   OT  PT  HH  RN       Long term SNF/Inpatient Rehab    Independent/assisted living    Hospice    Other:       PATIENT CONDITION AT DISCHARGE:     Functional status    Poor     Deconditioned     Independent      Cognition     Lucid     Forgetful     Dementia      Catheters/lines (plus indication)    Pope     PICC     PEG     None      Code status     Full code     DNR      PHYSICAL EXAMINATION AT DISCHARGE:    Constitutional:  Mild to moderate distress   ENT:  Neck supple   Resp:  Coarse sounds and crackles   CV:  Normal rate, radial pulses palpable    GI:  Soft, non distended    Neurologic:  Alert       CHRONIC MEDICAL DIAGNOSES:  Problem List as of 7/30/2018  Never Reviewed          Codes Class Noted - Resolved    Seizure ([facility] ICD-10-CM: R56.9  ICD-9-CM: 780.39  8/2/2017 - Present        UTI (urinary tract infection) ICD-10-CM: N39.0  ICD-9-CM: 599.0  8/2/2017 - Present        Dementia (Chronic) ICD-10-CM: F03.90  ICD-9-CM: 294.20  8/2/2017 - Present        Hypothyroidism (Chronic) ICD-10-CM: E03.9  ICD-9-CM: 244.9  8/2/2017 - Present        HTN (hypertension) (Chronic) ICD-10-CM: I10  ICD-9-CM: 401.9  8/2/2017 - Present        PVD (peripheral vascular disease) ([facility] (Chronic) ICD-10-CM: I73.9  ICD-9-CM: 443.9  8/2/2017 - Present        * (Principal)RESOLVED: Hypothermia ICD-10-CM: T68. Mariana Weaver  ICD-9-CM: 991.6  7/26/2018 - 7/30/2018            Greater than 30 minutes were spent with the patient on counseling and coordination of care    Signed:   Jere Hurtado MD  7/30/2018  11:28 AM

## 2018-07-30 NOTE — HOSPICE
190 Linda [facility]zman RN consultation visit note. Order for hospice noted. History and events of this hospitalization reviewed. This was a courtesy visit and info session as patient had been accepted to hospice at [facility] for transfer back there in the morning is being planned Please call us if you would like for patient to be assessed to be appropriate for inpatient hospice prior to transfer. Met in patient's room with patient, daughter Guillermo Wright, son Ferdinand Yee and friend Nando Garcia. Support given at this time. Hospice philosophy and scope of services presented. Many questions and concerns addressed Understanding of information and appreciation expressed Thank you for asking us to be a part of the care for this loved lady and her family. Please call [phone] if questions or concerns Deanna Ballesteros RN [facility]

## 2018-07-30 NOTE — PROGRESS NOTES
07/30/18 1109 Vital Signs Temp 99 °F (37.2 °C) Temp Source Axillary Pulse (Heart Rate) (!) 131 Heart Rate Source Monitor Resp Rate 26  
O2 Sat (%) (!) 62 % Level of Consciousness Responds to Voice /65 MAP (Calculated) 82 MEWS Score 6 Oxygen Therapy O2 Device Nasal cannula O2 Flow Rate (L/min) 6 l/min MD Melyssa Hooper aware and in room to assess pt, per MD turn O2 nasal cannula to 6L and DC remote tele and DC IV fluid, hold magnesium. 5105 - Called hospice team, asked them to call me back.

## 2018-07-30 NOTE — PROGRESS NOTES
Problem: Falls - Risk of 
Goal: *Absence of Falls Document Juan José Dave Fall Risk and appropriate interventions in the flowsheet. Outcome: Progressing Towards Goal 
Fall Risk Interventions: 
  
 
Mentation Interventions: Door open when patient unattended Medication Interventions: Patient to call before getting OOB Elimination Interventions: Patient to call for help with toileting needs

## 2018-07-30 NOTE — PROGRESS NOTES
Bedside and Verbal shift change report given to Juan Carlos Pino RN (oncoming nurse) by Tara Ford RN (offgoing nurse). Report included the following information SBAR, Kardex, Accordion and Recent Results.

## 2018-07-31 LAB
BACTERIA SPEC CULT: NORMAL
SERVICE CMNT-IMP: NORMAL

## 2019-07-16 NOTE — PROGRESS NOTES
----- Message from Josh Strong PA-C sent at 7/15/2019  6:05 PM EDT -----  Please call patient to schedule a follow-up appointment to review her labs    Thank you Care Management Interventions  PCP Verified by CM: Yes Diamond Pelaez MD)  Mode of Transport at Discharge: Other (see comment)  Transition of Care Consult (CM Consult): Other  MyChart Signup: No  Discharge Durable Medical Equipment: No  Physical Therapy Consult: Yes  Occupational Therapy Consult: Yes  Speech Therapy Consult: No  Current Support Network: [address] [place] [address] ([address] [facility])  Confirm Follow Up Transport:  (S)  Plan discussed with Pt/Family/Caregiver: Yes  Freedom of Choice Offered:  (N/A)  Discharge Location  Discharge Placement: Other: (Return to long term care)    CM met with patient and daughter Hamzah Castellanos. Patient was too confused to participate in the discussion. Per daughter, patient is in long term care at [address] ([phone]). Daughter said that  at [facility] is Val Romero, . Daughter is awaiting call back from Ms Brian Hooks to discuss patient's care needs. Daughter confirmed PCP, health insurance, and prescription coverage with suggestion to call the facility to get missing insurance info. Transport at discharge will be probably be by S stretcher. CM called Ariel Redding and left message. Call was returned by Rosena Meigs, [facility] Director of Nursing. CM gave Ms Skyler Burgess brief verbal update regarding patient's status. Ms Skyler Burgess then faxed patient's health insurance info to [facility], with copy placed in the chart and copy given to Kiel Barnes, Patient Access Dept, for [facility] update.